# Patient Record
Sex: MALE | Race: WHITE | Employment: FULL TIME | URBAN - METROPOLITAN AREA
[De-identification: names, ages, dates, MRNs, and addresses within clinical notes are randomized per-mention and may not be internally consistent; named-entity substitution may affect disease eponyms.]

---

## 2017-03-15 RX ORDER — SIMVASTATIN 20 MG
TABLET ORAL
Qty: 90 TABLET | Refills: 0 | OUTPATIENT
Start: 2017-03-15

## 2017-03-22 ENCOUNTER — TELEPHONE (OUTPATIENT)
Dept: FAMILY MEDICINE CLINIC | Age: 57
End: 2017-03-22

## 2017-03-22 DIAGNOSIS — E78.00 HYPERCHOLESTEROLEMIA: ICD-10-CM

## 2017-03-22 DIAGNOSIS — Z12.5 SCREENING FOR PROSTATE CANCER: ICD-10-CM

## 2017-03-22 DIAGNOSIS — Z11.59 NEED FOR HEPATITIS C SCREENING TEST: ICD-10-CM

## 2017-03-22 DIAGNOSIS — Z00.00 ROUTINE GENERAL MEDICAL EXAMINATION AT A HEALTH CARE FACILITY: Primary | ICD-10-CM

## 2017-03-31 DIAGNOSIS — Z00.00 ROUTINE GENERAL MEDICAL EXAMINATION AT A HEALTH CARE FACILITY: ICD-10-CM

## 2017-03-31 DIAGNOSIS — Z11.59 NEED FOR HEPATITIS C SCREENING TEST: ICD-10-CM

## 2017-03-31 DIAGNOSIS — E78.00 HYPERCHOLESTEROLEMIA: ICD-10-CM

## 2017-03-31 DIAGNOSIS — Z12.5 SCREENING FOR PROSTATE CANCER: ICD-10-CM

## 2017-03-31 LAB
A/G RATIO: 1.7 (ref 1.1–2.2)
ALBUMIN SERPL-MCNC: 4.2 G/DL (ref 3.4–5)
ALP BLD-CCNC: 95 U/L (ref 40–129)
ALT SERPL-CCNC: 28 U/L (ref 10–40)
ANION GAP SERPL CALCULATED.3IONS-SCNC: 13 MMOL/L (ref 3–16)
AST SERPL-CCNC: 16 U/L (ref 15–37)
BILIRUB SERPL-MCNC: 0.4 MG/DL (ref 0–1)
BUN BLDV-MCNC: 15 MG/DL (ref 7–20)
CALCIUM SERPL-MCNC: 9.1 MG/DL (ref 8.3–10.6)
CHLORIDE BLD-SCNC: 106 MMOL/L (ref 99–110)
CHOLESTEROL, TOTAL: 177 MG/DL (ref 0–199)
CO2: 25 MMOL/L (ref 21–32)
CREAT SERPL-MCNC: 0.7 MG/DL (ref 0.9–1.3)
GFR AFRICAN AMERICAN: >60
GFR NON-AFRICAN AMERICAN: >60
GLOBULIN: 2.5 G/DL
GLUCOSE BLD-MCNC: 94 MG/DL (ref 70–99)
HDLC SERPL-MCNC: 42 MG/DL (ref 40–60)
HEPATITIS C ANTIBODY INTERPRETATION: NORMAL
LDL CHOLESTEROL CALCULATED: 119 MG/DL
POTASSIUM SERPL-SCNC: 4.8 MMOL/L (ref 3.5–5.1)
PROSTATE SPECIFIC ANTIGEN: 0.27 NG/ML (ref 0–4)
SODIUM BLD-SCNC: 144 MMOL/L (ref 136–145)
TOTAL PROTEIN: 6.7 G/DL (ref 6.4–8.2)
TRIGL SERPL-MCNC: 82 MG/DL (ref 0–150)
VLDLC SERPL CALC-MCNC: 16 MG/DL

## 2017-04-21 ENCOUNTER — OFFICE VISIT (OUTPATIENT)
Dept: FAMILY MEDICINE CLINIC | Age: 57
End: 2017-04-21

## 2017-04-21 VITALS
DIASTOLIC BLOOD PRESSURE: 80 MMHG | WEIGHT: 279 LBS | BODY MASS INDEX: 39.06 KG/M2 | HEIGHT: 71 IN | SYSTOLIC BLOOD PRESSURE: 110 MMHG | TEMPERATURE: 98.2 F

## 2017-04-21 DIAGNOSIS — Z12.5 SCREENING FOR PROSTATE CANCER: ICD-10-CM

## 2017-04-21 DIAGNOSIS — E78.00 HYPERCHOLESTEROLEMIA: ICD-10-CM

## 2017-04-21 DIAGNOSIS — I83.93 VARICOSE VEINS OF BOTH LOWER EXTREMITIES: ICD-10-CM

## 2017-04-21 DIAGNOSIS — Z00.00 PREVENTATIVE HEALTH CARE: Primary | ICD-10-CM

## 2017-04-21 DIAGNOSIS — E66.9 OBESITY (BMI 30-39.9): ICD-10-CM

## 2017-04-21 PROCEDURE — 99396 PREV VISIT EST AGE 40-64: CPT | Performed by: FAMILY MEDICINE

## 2017-04-21 RX ORDER — SIMVASTATIN 20 MG
TABLET ORAL
Qty: 90 TABLET | Refills: 3 | Status: SHIPPED | OUTPATIENT
Start: 2017-04-21 | End: 2018-05-29 | Stop reason: SDUPTHER

## 2017-04-21 ASSESSMENT — PATIENT HEALTH QUESTIONNAIRE - PHQ9
1. LITTLE INTEREST OR PLEASURE IN DOING THINGS: 0
2. FEELING DOWN, DEPRESSED OR HOPELESS: 0
SUM OF ALL RESPONSES TO PHQ QUESTIONS 1-9: 0
SUM OF ALL RESPONSES TO PHQ9 QUESTIONS 1 & 2: 0

## 2017-05-22 ENCOUNTER — OFFICE VISIT (OUTPATIENT)
Dept: SURGERY | Age: 57
End: 2017-05-22

## 2017-05-22 VITALS
HEIGHT: 70 IN | BODY MASS INDEX: 40.23 KG/M2 | WEIGHT: 281 LBS | SYSTOLIC BLOOD PRESSURE: 127 MMHG | HEART RATE: 96 BPM | DIASTOLIC BLOOD PRESSURE: 73 MMHG

## 2017-05-22 DIAGNOSIS — E66.9 OBESITY (BMI 30-39.9): ICD-10-CM

## 2017-05-22 DIAGNOSIS — I83.893 VARICOSE VEINS OF BOTH LOWER EXTREMITIES WITH COMPLICATIONS: Primary | ICD-10-CM

## 2017-05-22 DIAGNOSIS — E78.00 HYPERCHOLESTEROLEMIA: ICD-10-CM

## 2017-05-22 DIAGNOSIS — I87.2 CHRONIC VENOUS INSUFFICIENCY: ICD-10-CM

## 2017-05-22 PROCEDURE — 99243 OFF/OP CNSLTJ NEW/EST LOW 30: CPT | Performed by: SURGERY

## 2017-05-22 ASSESSMENT — ENCOUNTER SYMPTOMS
BLOOD IN STOOL: 0
ABDOMINAL PAIN: 0
ROS SKIN COMMENTS: NO SKIN WOUNDS.
SHORTNESS OF BREATH: 0

## 2017-06-02 ENCOUNTER — PROCEDURE VISIT (OUTPATIENT)
Dept: SURGERY | Age: 57
End: 2017-06-02

## 2017-06-02 ENCOUNTER — OFFICE VISIT (OUTPATIENT)
Dept: SURGERY | Age: 57
End: 2017-06-02

## 2017-06-02 VITALS
SYSTOLIC BLOOD PRESSURE: 123 MMHG | DIASTOLIC BLOOD PRESSURE: 84 MMHG | WEIGHT: 281.6 LBS | HEIGHT: 70 IN | BODY MASS INDEX: 40.31 KG/M2 | HEART RATE: 82 BPM

## 2017-06-02 DIAGNOSIS — I87.2 CHRONIC VENOUS INSUFFICIENCY: ICD-10-CM

## 2017-06-02 DIAGNOSIS — I83.893 VARICOSE VEINS OF BOTH LOWER EXTREMITIES WITH COMPLICATIONS: ICD-10-CM

## 2017-06-02 DIAGNOSIS — I87.2 CHRONIC VENOUS INSUFFICIENCY: Primary | ICD-10-CM

## 2017-06-02 DIAGNOSIS — E66.9 OBESITY (BMI 30-39.9): ICD-10-CM

## 2017-06-02 PROCEDURE — 93970 EXTREMITY STUDY: CPT | Performed by: SURGERY

## 2017-06-02 PROCEDURE — 99213 OFFICE O/P EST LOW 20 MIN: CPT | Performed by: SURGERY

## 2017-06-02 ASSESSMENT — ENCOUNTER SYMPTOMS: COLOR CHANGE: 1

## 2017-06-28 ENCOUNTER — OFFICE VISIT (OUTPATIENT)
Dept: FAMILY MEDICINE CLINIC | Age: 57
End: 2017-06-28

## 2017-06-28 ENCOUNTER — HOSPITAL ENCOUNTER (OUTPATIENT)
Dept: NON INVASIVE DIAGNOSTICS | Age: 57
Discharge: HOME | End: 2017-06-28
Attending: FAMILY MEDICINE | Admitting: FAMILY MEDICINE

## 2017-06-28 VITALS
HEIGHT: 70 IN | BODY MASS INDEX: 39.03 KG/M2 | TEMPERATURE: 97.9 F | SYSTOLIC BLOOD PRESSURE: 122 MMHG | DIASTOLIC BLOOD PRESSURE: 78 MMHG | WEIGHT: 272.6 LBS

## 2017-06-28 DIAGNOSIS — J40 BRONCHITIS: ICD-10-CM

## 2017-06-28 DIAGNOSIS — R04.2 HEMOPTYSIS: ICD-10-CM

## 2017-06-28 DIAGNOSIS — J40 BRONCHITIS: Primary | ICD-10-CM

## 2017-06-28 PROCEDURE — 99213 OFFICE O/P EST LOW 20 MIN: CPT | Performed by: FAMILY MEDICINE

## 2017-06-28 RX ORDER — AMOXICILLIN AND CLAVULANATE POTASSIUM 875; 125 MG/1; MG/1
1 TABLET, FILM COATED ORAL 2 TIMES DAILY
Qty: 20 TABLET | Refills: 0 | Status: SHIPPED | OUTPATIENT
Start: 2017-06-28 | End: 2017-06-28 | Stop reason: ALTCHOICE

## 2017-06-28 RX ORDER — LEVOFLOXACIN 500 MG/1
500 TABLET, FILM COATED ORAL DAILY
Qty: 10 TABLET | Refills: 0 | Status: SHIPPED | OUTPATIENT
Start: 2017-06-28 | End: 2017-07-08

## 2017-07-10 ENCOUNTER — OFFICE VISIT (OUTPATIENT)
Dept: FAMILY MEDICINE CLINIC | Age: 57
End: 2017-07-10

## 2017-07-10 VITALS
BODY MASS INDEX: 37.88 KG/M2 | DIASTOLIC BLOOD PRESSURE: 78 MMHG | SYSTOLIC BLOOD PRESSURE: 122 MMHG | TEMPERATURE: 98.1 F | WEIGHT: 264.6 LBS | HEIGHT: 70 IN

## 2017-07-10 DIAGNOSIS — J18.9 PNEUMONIA OF LEFT LOWER LOBE DUE TO INFECTIOUS ORGANISM: ICD-10-CM

## 2017-07-10 DIAGNOSIS — K62.5 RECTAL BLEEDING: Primary | ICD-10-CM

## 2017-07-10 DIAGNOSIS — R05.9 COUGH: ICD-10-CM

## 2017-07-10 PROCEDURE — 99213 OFFICE O/P EST LOW 20 MIN: CPT | Performed by: FAMILY MEDICINE

## 2017-07-10 RX ORDER — ALBUTEROL SULFATE 90 UG/1
2 AEROSOL, METERED RESPIRATORY (INHALATION) EVERY 6 HOURS PRN
Qty: 1 INHALER | Refills: 0 | Status: SHIPPED | OUTPATIENT
Start: 2017-07-10 | End: 2017-08-11

## 2017-07-23 ENCOUNTER — HOSPITAL ENCOUNTER (OUTPATIENT)
Dept: NON INVASIVE DIAGNOSTICS | Age: 57
Discharge: HOME | End: 2017-07-23
Attending: FAMILY MEDICINE | Admitting: FAMILY MEDICINE

## 2017-07-23 DIAGNOSIS — R05.9 COUGH: ICD-10-CM

## 2017-07-23 DIAGNOSIS — J18.9 PNEUMONIA OF LEFT LOWER LOBE DUE TO INFECTIOUS ORGANISM: ICD-10-CM

## 2017-08-11 ENCOUNTER — OFFICE VISIT (OUTPATIENT)
Dept: FAMILY MEDICINE CLINIC | Age: 57
End: 2017-08-11

## 2017-08-11 VITALS
SYSTOLIC BLOOD PRESSURE: 118 MMHG | BODY MASS INDEX: 37.51 KG/M2 | HEIGHT: 70 IN | DIASTOLIC BLOOD PRESSURE: 78 MMHG | WEIGHT: 262 LBS | HEART RATE: 71 BPM | TEMPERATURE: 98 F

## 2017-08-11 DIAGNOSIS — R10.9 LEFT SIDED ABDOMINAL PAIN: ICD-10-CM

## 2017-08-11 DIAGNOSIS — R07.9 LEFT SIDED CHEST PAIN: Primary | ICD-10-CM

## 2017-08-11 PROCEDURE — 99214 OFFICE O/P EST MOD 30 MIN: CPT | Performed by: FAMILY MEDICINE

## 2017-08-11 PROCEDURE — 93000 ELECTROCARDIOGRAM COMPLETE: CPT | Performed by: FAMILY MEDICINE

## 2017-08-11 RX ORDER — RANITIDINE 300 MG/1
300 TABLET ORAL NIGHTLY
Qty: 30 TABLET | Refills: 3 | Status: SHIPPED | OUTPATIENT
Start: 2017-08-11 | End: 2017-12-19 | Stop reason: SDUPTHER

## 2017-08-11 ASSESSMENT — ENCOUNTER SYMPTOMS
SHORTNESS OF BREATH: 0
ABDOMINAL PAIN: 1

## 2017-08-24 ENCOUNTER — HOSPITAL ENCOUNTER (OUTPATIENT)
Dept: NON INVASIVE DIAGNOSTICS | Age: 57
Discharge: HOME | End: 2017-08-24
Attending: FAMILY MEDICINE | Admitting: FAMILY MEDICINE

## 2017-08-24 DIAGNOSIS — R07.9 CHEST PAIN: ICD-10-CM

## 2017-09-01 ENCOUNTER — TELEPHONE (OUTPATIENT)
Dept: SURGERY | Age: 57
End: 2017-09-01

## 2017-09-29 ENCOUNTER — TELEPHONE (OUTPATIENT)
Dept: SURGERY | Age: 57
End: 2017-09-29

## 2017-09-29 NOTE — TELEPHONE ENCOUNTER
Patient states that he has received approval from his insurance company to proceed with surgery for varicose veins. He is going to fax copy of this to our office on Monday as we have not received this yet.

## 2017-09-29 NOTE — TELEPHONE ENCOUNTER
Patient was seen last June for veracious veins in both legs. He received the letter of approval about 3 weeks ago from his insurance company. He would like to have that procedure set up as soon as possible.

## 2017-10-09 ENCOUNTER — TELEPHONE (OUTPATIENT)
Dept: SURGERY | Age: 57
End: 2017-10-09

## 2017-10-10 ENCOUNTER — TELEPHONE (OUTPATIENT)
Dept: SURGERY | Age: 57
End: 2017-10-10

## 2017-10-12 ENCOUNTER — HOSPITAL ENCOUNTER (OUTPATIENT)
Dept: CARDIAC CATH/INVASIVE PROCEDURES | Age: 57
Discharge: HOME | End: 2017-10-12
Attending: SURGERY | Admitting: SURGERY

## 2017-10-12 VITALS — WEIGHT: 267 LBS | HEIGHT: 70 IN | BODY MASS INDEX: 38.22 KG/M2

## 2017-10-12 DIAGNOSIS — I83.893 VARICOSE VEINS OF BOTH LOWER EXTREMITIES WITH COMPLICATIONS: Primary | ICD-10-CM

## 2017-10-12 PROCEDURE — 36478 ENDOVENOUS LASER 1ST VEIN: CPT | Performed by: SURGERY

## 2017-10-12 RX ORDER — SODIUM CHLORIDE 0.9 % (FLUSH) 0.9 %
10 SYRINGE (ML) INJECTION EVERY 12 HOURS SCHEDULED
Status: DISCONTINUED | OUTPATIENT
Start: 2017-10-12 | End: 2017-10-12 | Stop reason: ALTCHOICE

## 2017-10-12 RX ORDER — SODIUM CHLORIDE 9 MG/ML
INJECTION, SOLUTION INTRAVENOUS CONTINUOUS
Status: DISCONTINUED | OUTPATIENT
Start: 2017-10-12 | End: 2017-10-12 | Stop reason: ALTCHOICE

## 2017-10-12 RX ORDER — ACETAMINOPHEN 325 MG/1
650 TABLET ORAL EVERY 4 HOURS PRN
Status: DISCONTINUED | OUTPATIENT
Start: 2017-10-12 | End: 2017-10-13 | Stop reason: HOSPADM

## 2017-10-12 RX ORDER — MORPHINE SULFATE 10 MG/ML
2 INJECTION, SOLUTION INTRAMUSCULAR; INTRAVENOUS
Status: ACTIVE | OUTPATIENT
Start: 2017-10-12 | End: 2017-10-12

## 2017-10-12 RX ORDER — SODIUM CHLORIDE 0.9 % (FLUSH) 0.9 %
10 SYRINGE (ML) INJECTION PRN
Status: DISCONTINUED | OUTPATIENT
Start: 2017-10-12 | End: 2017-10-12 | Stop reason: ALTCHOICE

## 2017-10-12 RX ORDER — SODIUM CHLORIDE 9 MG/ML
INJECTION, SOLUTION INTRAVENOUS CONTINUOUS
Status: DISCONTINUED | OUTPATIENT
Start: 2017-10-12 | End: 2017-10-13 | Stop reason: HOSPADM

## 2017-10-12 RX ORDER — NAPROXEN 500 MG/1
500 TABLET ORAL EVERY 12 HOURS PRN
Qty: 20 TABLET | Refills: 3 | Status: SHIPPED | OUTPATIENT
Start: 2017-10-12 | End: 2018-06-15

## 2017-10-12 RX ORDER — FENTANYL CITRATE 50 UG/ML
25 INJECTION, SOLUTION INTRAMUSCULAR; INTRAVENOUS
Status: ACTIVE | OUTPATIENT
Start: 2017-10-12 | End: 2017-10-12

## 2017-10-12 RX ORDER — ONDANSETRON 2 MG/ML
4 INJECTION INTRAMUSCULAR; INTRAVENOUS EVERY 6 HOURS PRN
Status: DISCONTINUED | OUTPATIENT
Start: 2017-10-12 | End: 2017-10-13 | Stop reason: HOSPADM

## 2017-10-12 RX ORDER — 0.9 % SODIUM CHLORIDE 0.9 %
500 INTRAVENOUS SOLUTION INTRAVENOUS PRN
Status: DISCONTINUED | OUTPATIENT
Start: 2017-10-12 | End: 2017-10-13 | Stop reason: HOSPADM

## 2017-10-12 NOTE — PRE SEDATION
Sedation Pre-Procedure Note    Patient Name: Deborah Neil   YOB: 1960  Room/Bed: Room/bed info not found  Medical Record Number: [id number]  Date: 10/12/2017   Time: 7:47 AM       Indication:  CVI    Consent: I have discussed with the patient and/or the patient representative the indication, alternatives, and the possible risks and/or complications of the planned procedure and the anesthesia methods. The patient and/or patient representative appear to understand and agree to proceed. Vital Signs: There were no vitals filed for this visit. Past Medical History:   has a past medical history of Hyperlipidemia. Past Surgical History:   has a past surgical history that includes Knee cartilage surgery; Foot surgery; and Colonoscopy (11/28/2012). Medications:   Scheduled Meds:    sodium chloride flush  10 mL Intravenous 2 times per day     Continuous Infusions:    sodium chloride       PRN Meds: sodium chloride flush  Home Meds:   Prior to Admission medications    Medication Sig Start Date End Date Taking? Authorizing Provider   ranitidine (ZANTAC) 300 MG tablet Take 1 tablet by mouth nightly 8/11/17  Yes Ana Maria Moise,    simvastatin (ZOCOR) 20 MG tablet TAKE ONE TABLET BY MOUTH EVERY EVENING 4/21/17  Yes Bob Moise, DO   aspirin 81 MG EC tablet Take 81 mg by mouth daily. Yes Historical Provider, MD          Pre-Sedation Documentation and Exam:   I have personally completed a history, physical exam & review of systems for this patient (see notes).     Mallampati Airway Assessment:  normal    Prior History of Anesthesia Complications:   none    ASA Classification:  Class 2 - A normal healthy patient with mild systemic disease    Sedation/ Anesthesia Plan:   intravenous sedation    Medications Planned:   midazolam (Versed) intravenously and fentanyl intravenously    Patient is an appropriate candidate for plan of sedation: yes    Electronically signed by Carlos Maradiaga MD

## 2017-10-12 NOTE — PROCEDURES
surrounding the left great saphenous vein. Laser  ablation of the left great saphenous vein was performed by treating 45  cm with 2025 joules of energy. A separate access was then gained to the right great saphenous vein in  the proximal medial right calf. Access was gained again with the  6-Australian sheath, through which a laser fiber was advanced. The tip of  the laser was positioned 3 cm distal to the right saphenous-femoral  junction. Tumescent anesthesia was infused into the subcutaneous  tissues surrounding the right great saphenous vein, and laser ablation  of the right great saphenous vein was performed, treating 48 cm with  2300 joules of energy. The bilateral lower extremities were wrapped with compression  bandages, and the patient tolerated the procedure well. Eddie Acuña MD    D: 10/12/2017 8:34:10       T: 10/12/2017 16:35:29     [id number]  Job#: [id number]     Doc#: [id number]    CC:  WAQAS Cohen MD

## 2017-10-20 ENCOUNTER — PROCEDURE VISIT (OUTPATIENT)
Dept: SURGERY | Age: 57
End: 2017-10-20

## 2017-10-20 DIAGNOSIS — I87.2 CHRONIC VENOUS INSUFFICIENCY: ICD-10-CM

## 2017-10-20 DIAGNOSIS — I83.893 VARICOSE VEINS OF BOTH LOWER EXTREMITIES WITH COMPLICATIONS: Primary | ICD-10-CM

## 2017-10-20 PROCEDURE — 93970 EXTREMITY STUDY: CPT | Performed by: SURGERY

## 2017-11-16 ENCOUNTER — TELEPHONE (OUTPATIENT)
Dept: SURGERY | Age: 57
End: 2017-11-16

## 2017-11-16 NOTE — TELEPHONE ENCOUNTER
Spoke with patients wife and advised her that he had the procedure over a month ago and last scan showed no DVT

## 2017-12-08 ENCOUNTER — OFFICE VISIT (OUTPATIENT)
Dept: SURGERY | Age: 57
End: 2017-12-08

## 2017-12-08 VITALS
HEART RATE: 79 BPM | SYSTOLIC BLOOD PRESSURE: 114 MMHG | HEIGHT: 70 IN | BODY MASS INDEX: 38.65 KG/M2 | WEIGHT: 270 LBS | DIASTOLIC BLOOD PRESSURE: 75 MMHG

## 2017-12-08 DIAGNOSIS — I83.893 VARICOSE VEINS OF BOTH LOWER EXTREMITIES WITH COMPLICATIONS: Primary | ICD-10-CM

## 2017-12-08 DIAGNOSIS — I87.2 CHRONIC VENOUS INSUFFICIENCY: ICD-10-CM

## 2017-12-08 PROCEDURE — 99213 OFFICE O/P EST LOW 20 MIN: CPT | Performed by: SURGERY

## 2017-12-08 ASSESSMENT — ENCOUNTER SYMPTOMS: COLOR CHANGE: 1

## 2017-12-08 NOTE — PROGRESS NOTES
ablation of incompetent bilateral great saphenous veins. He is improved but continues to be bothered by varicosity pain in the bilateral legs from remaining secondary varicosities. The pain is severe enough to keep him awake at night. Venous duplex imaging was performed 10/20/17 after the laser ablations and it shows successful closure of the bilateral great saphenous veins with no DVT. Plan:       Recommend chemical ablation of remaining secondary symptomatic varicose veins of the bilateral lower extremities. Patient to elevate leg(s) with the ankle at or above the level of the heart as needed to relieve leg pain and swelling. Patient to participate in exercise as tolerated with focus on the leg(s) including, daily walking, repetitive toe pointing, and calve muscle pumping/stretching as tolerated. Patient was instructed to continue wearing compression stockings (20-30 mmHg) on a daily basis, off every night. Pre-determination for the vein closure procedure will be submitted to the insurance company on your behalf. GIOVANNY Evans MA am scribing for and in the presence of Gillian Gonzalez MD on this date of 12/08/17 at 11:00 AM    I Gillian Gonzalez MD personally performed the services described in this documentation as scribed by the Certified Medical Assistant Devonte Evans in my presence and it is both accurate and complete.

## 2017-12-19 RX ORDER — RANITIDINE 300 MG/1
TABLET ORAL
Qty: 30 TABLET | Refills: 2 | Status: SHIPPED | OUTPATIENT
Start: 2017-12-19 | End: 2018-04-19 | Stop reason: SDUPTHER

## 2018-04-23 RX ORDER — RANITIDINE 300 MG/1
TABLET ORAL
Qty: 30 TABLET | Refills: 1 | Status: SHIPPED | OUTPATIENT
Start: 2018-04-23 | End: 2019-09-30

## 2018-05-31 ENCOUNTER — TELEPHONE (OUTPATIENT)
Dept: FAMILY MEDICINE CLINIC | Age: 58
End: 2018-05-31

## 2018-05-31 RX ORDER — SIMVASTATIN 20 MG
TABLET ORAL
Qty: 30 TABLET | Refills: 0 | Status: SHIPPED | OUTPATIENT
Start: 2018-05-31 | End: 2018-06-15 | Stop reason: SDUPTHER

## 2018-06-01 DIAGNOSIS — E78.00 HYPERCHOLESTEROLEMIA: ICD-10-CM

## 2018-06-01 DIAGNOSIS — Z11.4 SCREENING FOR HIV WITHOUT PRESENCE OF RISK FACTORS: ICD-10-CM

## 2018-06-01 DIAGNOSIS — Z12.5 SCREENING FOR PROSTATE CANCER: ICD-10-CM

## 2018-06-01 DIAGNOSIS — Z00.00 ROUTINE GENERAL MEDICAL EXAMINATION AT A HEALTH CARE FACILITY: Primary | ICD-10-CM

## 2018-06-08 DIAGNOSIS — Z00.00 ROUTINE GENERAL MEDICAL EXAMINATION AT A HEALTH CARE FACILITY: ICD-10-CM

## 2018-06-08 DIAGNOSIS — Z11.4 SCREENING FOR HIV WITHOUT PRESENCE OF RISK FACTORS: ICD-10-CM

## 2018-06-08 DIAGNOSIS — Z12.5 SCREENING FOR PROSTATE CANCER: ICD-10-CM

## 2018-06-08 DIAGNOSIS — E78.00 HYPERCHOLESTEROLEMIA: ICD-10-CM

## 2018-06-08 LAB
A/G RATIO: 1.9 (ref 1.1–2.2)
ALBUMIN SERPL-MCNC: 4.3 G/DL (ref 3.4–5)
ALP BLD-CCNC: 92 U/L (ref 40–129)
ALT SERPL-CCNC: 29 U/L (ref 10–40)
ANION GAP SERPL CALCULATED.3IONS-SCNC: 13 MMOL/L (ref 3–16)
AST SERPL-CCNC: 16 U/L (ref 15–37)
BILIRUB SERPL-MCNC: 0.3 MG/DL (ref 0–1)
BUN BLDV-MCNC: 12 MG/DL (ref 7–20)
CALCIUM SERPL-MCNC: 8.8 MG/DL (ref 8.3–10.6)
CHLORIDE BLD-SCNC: 105 MMOL/L (ref 99–110)
CHOLESTEROL, TOTAL: 139 MG/DL (ref 0–199)
CO2: 23 MMOL/L (ref 21–32)
CREAT SERPL-MCNC: 0.6 MG/DL (ref 0.9–1.3)
GFR AFRICAN AMERICAN: >60
GFR NON-AFRICAN AMERICAN: >60
GLOBULIN: 2.3 G/DL
GLUCOSE BLD-MCNC: 89 MG/DL (ref 70–99)
HDLC SERPL-MCNC: 41 MG/DL (ref 40–60)
LDL CHOLESTEROL CALCULATED: 87 MG/DL
POTASSIUM SERPL-SCNC: 4.5 MMOL/L (ref 3.5–5.1)
PROSTATE SPECIFIC ANTIGEN: 0.28 NG/ML (ref 0–4)
SODIUM BLD-SCNC: 141 MMOL/L (ref 136–145)
TOTAL PROTEIN: 6.6 G/DL (ref 6.4–8.2)
TRIGL SERPL-MCNC: 54 MG/DL (ref 0–150)
VLDLC SERPL CALC-MCNC: 11 MG/DL

## 2018-06-11 LAB
HIV AG/AB: NORMAL
HIV ANTIGEN: NORMAL
HIV-1 ANTIBODY: NORMAL
HIV-2 AB: NORMAL

## 2018-06-15 ENCOUNTER — OFFICE VISIT (OUTPATIENT)
Dept: FAMILY MEDICINE CLINIC | Age: 58
End: 2018-06-15

## 2018-06-15 ENCOUNTER — HOSPITAL ENCOUNTER (OUTPATIENT)
Dept: NON INVASIVE DIAGNOSTICS | Age: 58
Discharge: HOME | End: 2018-06-15
Attending: FAMILY MEDICINE | Admitting: FAMILY MEDICINE

## 2018-06-15 VITALS
OXYGEN SATURATION: 98 % | DIASTOLIC BLOOD PRESSURE: 84 MMHG | BODY MASS INDEX: 39.74 KG/M2 | HEIGHT: 70 IN | RESPIRATION RATE: 12 BRPM | WEIGHT: 277.6 LBS | TEMPERATURE: 98.5 F | HEART RATE: 78 BPM | SYSTOLIC BLOOD PRESSURE: 122 MMHG

## 2018-06-15 DIAGNOSIS — M54.5 CHRONIC BILATERAL LOW BACK PAIN, WITH SCIATICA PRESENCE UNSPECIFIED: ICD-10-CM

## 2018-06-15 DIAGNOSIS — G89.29 CHRONIC BILATERAL LOW BACK PAIN, WITH SCIATICA PRESENCE UNSPECIFIED: ICD-10-CM

## 2018-06-15 DIAGNOSIS — Z00.00 PREVENTATIVE HEALTH CARE: Primary | ICD-10-CM

## 2018-06-15 DIAGNOSIS — E78.00 HYPERCHOLESTEROLEMIA: ICD-10-CM

## 2018-06-15 DIAGNOSIS — Z23 NEED FOR TDAP VACCINATION: ICD-10-CM

## 2018-06-15 DIAGNOSIS — M79.604 BILATERAL LEG PAIN: ICD-10-CM

## 2018-06-15 DIAGNOSIS — M79.605 BILATERAL LEG PAIN: ICD-10-CM

## 2018-06-15 PROBLEM — M54.50 CHRONIC BILATERAL LOW BACK PAIN: Status: ACTIVE | Noted: 2018-06-15

## 2018-06-15 LAB — TOTAL CK: 60 U/L (ref 39–308)

## 2018-06-15 PROCEDURE — 90715 TDAP VACCINE 7 YRS/> IM: CPT | Performed by: FAMILY MEDICINE

## 2018-06-15 PROCEDURE — 90471 IMMUNIZATION ADMIN: CPT | Performed by: FAMILY MEDICINE

## 2018-06-15 PROCEDURE — 99396 PREV VISIT EST AGE 40-64: CPT | Performed by: FAMILY MEDICINE

## 2018-06-15 RX ORDER — SIMVASTATIN 20 MG
TABLET ORAL
Qty: 90 TABLET | Refills: 3 | Status: SHIPPED | OUTPATIENT
Start: 2018-06-15 | End: 2019-07-23 | Stop reason: SDUPTHER

## 2018-06-15 RX ORDER — PREDNISOLONE ACETATE 10 MG/ML
SUSPENSION/ DROPS OPHTHALMIC
COMMUNITY
End: 2018-06-15 | Stop reason: ALTCHOICE

## 2018-06-15 ASSESSMENT — PATIENT HEALTH QUESTIONNAIRE - PHQ9
SUM OF ALL RESPONSES TO PHQ9 QUESTIONS 1 & 2: 0
2. FEELING DOWN, DEPRESSED OR HOPELESS: 0
SUM OF ALL RESPONSES TO PHQ QUESTIONS 1-9: 0
1. LITTLE INTEREST OR PLEASURE IN DOING THINGS: 0

## 2018-07-02 ENCOUNTER — OFFICE VISIT (OUTPATIENT)
Dept: SURGERY | Age: 58
End: 2018-07-02

## 2018-07-02 VITALS
WEIGHT: 281 LBS | HEIGHT: 70 IN | BODY MASS INDEX: 40.23 KG/M2 | HEART RATE: 80 BPM | DIASTOLIC BLOOD PRESSURE: 71 MMHG | SYSTOLIC BLOOD PRESSURE: 116 MMHG

## 2018-07-02 DIAGNOSIS — I83.893 VARICOSE VEINS OF BOTH LOWER EXTREMITIES WITH COMPLICATIONS: ICD-10-CM

## 2018-07-02 DIAGNOSIS — I87.2 CHRONIC VENOUS INSUFFICIENCY: ICD-10-CM

## 2018-07-02 DIAGNOSIS — M25.561 ACUTE PAIN OF RIGHT KNEE: ICD-10-CM

## 2018-07-02 DIAGNOSIS — M54.31 SCIATICA OF RIGHT SIDE: Primary | ICD-10-CM

## 2018-07-02 PROCEDURE — 99243 OFF/OP CNSLTJ NEW/EST LOW 30: CPT | Performed by: SURGERY

## 2018-07-02 NOTE — PROGRESS NOTES
Subjective:      Patient ID: Isha Andres is a 63 y.o. male. Chief Complaint   Patient presents with    Leg Pain     Pt presents for Rt Lower Extremity pain. Previous seen by Dr. Mariana Gupta for Chronic Venous Insuffiency. Complians pain of the Rt Le > Lt LE mostly with driving from the calf region down to the ankle of the Rt LE. Does take Ibuprofen as needed. Does have surgical stockings have not applied in a while, previous Sx of the Varicose Veins last year. HPI  He has a 3 week history of right leg, deep pain that is worsened by sitting. Location is primarily in right posterior lateral thigh region. He did experience some back pain a couple of months ago and an xray was not too revealing. Did have a stumbling event after the back pain onset and this involved negotiating a set of steps when descending the steps  Did have venous intervention last November to do ablation on the superficial veins bilaterally. Has not worn stockings once the healing had taken place. He needs new stockings. Takes an occasional Ibuprofen  He is concerned about an upcoming driving trip for vacation to [address] for his wife who has stage 4 breast cancer. Past history of left knee meniscus repair many years ago      Review of Systems    Objective:   Physical Exam   Constitutional: He appears well-developed and well-nourished. Overweight and obese   HENT:   Head: Normocephalic. Neck: Neck supple. Cardiovascular: Normal rate, regular rhythm, S1 normal, normal heart sounds and intact distal pulses. Pulses:       Carotid pulses are 2+ on the right side, and 2+ on the left side. Radial pulses are 2+ on the right side, and 2+ on the left side. Femoral pulses are 2+ on the right side, and 2+ on the left side. Popliteal pulses are 2+ on the right side, and 2+ on the left side. Dorsalis pedis pulses are 2+ on the right side, and 2+ on the left side.         Posterior tibial pulses are 2+ on the right side, and 2+ on the left side. Widely distributed varicose veins in the skin and subcutaneous tissues with skin veins at risk for rupture particularly at medial thigh  Pigmentation changes to skin of both legs in gaiter area   Pulmonary/Chest: No respiratory distress. He has no wheezes. He has no rales. Abdominal: He exhibits no distension and no mass. There is no tenderness. There is no guarding. obesity   Musculoskeletal: He exhibits no edema. Right knee: Tenderness found. Lateral joint line tenderness noted. Lumbar back: He exhibits tenderness (right SI joint) and pain (straight leg raising increases pain in the back and right leg). Right upper leg: He exhibits deformity (tight hamstrings (frequently cramp by his report)). Right lower leg: He exhibits swelling. Left lower leg: He exhibits swelling ( more than righ). Neurological: He is alert. No cranial nerve deficit. Coordination normal.   Skin: Skin is warm. Venous stasis pigmentation bilaterally, right more intense than left. Vitals reviewed. Assessment:       Diagnosis Orders   1. Sciatica of right side     2. Acute pain of right knee     3. Chronic venous insufficiency     4. Varicose veins of both lower extremities with complications     5. Class 3 obesity due to excess calories with serious comorbidity and body mass index (BMI) of 40.0 to 44.9 in adult [facility])             Plan:      Stretch the hamstring muscles in the back of the thighs every day as we discussed in the office visit  Use Ibuprofen 800 mg 3 times daily on a full stomach to reduce the inflammation  Weight loss advised  Knee high 30-40 mm Hg stockings  (Rx written for stocking wear)  If the pain persists, you would be best served by an Orthopedic assessment.

## 2018-07-02 NOTE — PATIENT INSTRUCTIONS
Stretch the hamstring muscles in the back of the thighs every day as we discussed in the office visit  Use Ibuprofen 800 mg 3 times daily on a full stomach to reduce the inflammation  Knee high 30-40 mm Hg stockings  If the pain persists, you would be best served by an Orthopedic assessment.

## 2018-07-10 ASSESSMENT — ENCOUNTER SYMPTOMS
BLOOD IN STOOL: 0
SHORTNESS OF BREATH: 0
BACK PAIN: 1
ROS SKIN COMMENTS: NO SKIN WOUNDS.
ABDOMINAL PAIN: 0

## 2018-09-10 ENCOUNTER — OFFICE VISIT (OUTPATIENT)
Dept: FAMILY MEDICINE CLINIC | Age: 58
End: 2018-09-10

## 2018-09-10 VITALS
TEMPERATURE: 97.5 F | SYSTOLIC BLOOD PRESSURE: 120 MMHG | HEIGHT: 70 IN | WEIGHT: 283 LBS | DIASTOLIC BLOOD PRESSURE: 82 MMHG | BODY MASS INDEX: 40.52 KG/M2

## 2018-09-10 DIAGNOSIS — J06.9 UPPER RESPIRATORY TRACT INFECTION, UNSPECIFIED TYPE: Primary | ICD-10-CM

## 2018-09-10 PROCEDURE — 99213 OFFICE O/P EST LOW 20 MIN: CPT | Performed by: FAMILY MEDICINE

## 2018-09-10 RX ORDER — AZITHROMYCIN 250 MG/1
TABLET, FILM COATED ORAL
Qty: 1 PACKET | Refills: 0 | Status: SHIPPED | OUTPATIENT
Start: 2018-09-10 | End: 2018-09-20

## 2018-09-10 NOTE — PROGRESS NOTES
Subjective:      Patient ID: Ammon Hoover is a 63 y.o. male. Patient presents with:  Congestion: sore throat, sinus pressure, cough, wheezing x 1 week    Above sx. Does feel warm  Cough is productive no blood  No ear pain  No tobacco for > 20 years  Some pnd no sinus pain   His grandkid was ill     height is 5' 10\" (1.778 m) and weight is 283 lb (128.4 kg). His oral temperature is 97.5 °F (36.4 °C). His blood pressure is 120/82. Current Outpatient Prescriptions:     simvastatin (ZOCOR) 20 MG tablet, TAKE ONE TABLET BY MOUTH EVERY EVENING    aspirin 81 MG EC tablet, Take 81 mg by mouth daily.   ranitidine (ZANTAC) 300 MG tablet, TAKE ONE TABLET BY MOUTH EVERY NIGHT          Review of Systems    Objective:   Physical Exam   Constitutional: He appears well-developed and well-nourished. No distress. HENT:   Head: Normocephalic and atraumatic. Right Ear: Tympanic membrane and ear canal normal.   Left Ear: Tympanic membrane and ear canal normal.   Nose: Nose normal.   Mouth/Throat: Uvula is midline, oropharynx is clear and moist and mucous membranes are normal. No oral lesions. Neck: Neck supple. Pulmonary/Chest: Effort normal and breath sounds normal. No accessory muscle usage. No tachypnea. No respiratory distress. He has no decreased breath sounds. He has no wheezes. He has no rhonchi. He has no rales. Lymphadenopathy:        Head (right side): No submental and no submandibular adenopathy present. Head (left side): No submental and no submandibular adenopathy present. He has no cervical adenopathy. Right: No supraclavicular adenopathy present. Left: No supraclavicular adenopathy present. Neurological: He is alert. Skin: Skin is warm, dry and intact. He is not diaphoretic. No pallor. Assessment:        Diagnosis Orders   1.  Upper respiratory tract infection, unspecified type         Doing well  Exam was unremarkable      Plan:      Use fluids  Rest  Robitussin DM  Take the antibiotic  Call if not better        Orders Placed This Encounter   Medications    azithromycin (ZITHROMAX) 250 MG tablet     Sig: Take 2 tabs (500 mg) on Day 1, and take 1 tab (250 mg) on days 2 through 5.      Dispense:  1 packet     Refill:  0       Hanh Aldana MD

## 2019-07-23 DIAGNOSIS — E78.00 HYPERCHOLESTEROLEMIA: ICD-10-CM

## 2019-07-29 RX ORDER — SIMVASTATIN 20 MG
TABLET ORAL
Qty: 30 TABLET | Refills: 2 | Status: SHIPPED | OUTPATIENT
Start: 2019-07-29 | End: 2019-10-24 | Stop reason: SDUPTHER

## 2019-09-30 ENCOUNTER — TELEPHONE (OUTPATIENT)
Dept: FAMILY MEDICINE CLINIC | Age: 59
End: 2019-09-30

## 2019-09-30 ENCOUNTER — OFFICE VISIT (OUTPATIENT)
Dept: FAMILY MEDICINE CLINIC | Age: 59
End: 2019-09-30
Payer: COMMERCIAL

## 2019-09-30 VITALS
DIASTOLIC BLOOD PRESSURE: 74 MMHG | TEMPERATURE: 99.3 F | HEIGHT: 70 IN | SYSTOLIC BLOOD PRESSURE: 116 MMHG | BODY MASS INDEX: 37.34 KG/M2 | WEIGHT: 260.8 LBS

## 2019-09-30 DIAGNOSIS — Z12.5 SCREENING FOR PROSTATE CANCER: ICD-10-CM

## 2019-09-30 DIAGNOSIS — B96.89 ACUTE BACTERIAL SINUSITIS: Primary | ICD-10-CM

## 2019-09-30 DIAGNOSIS — J01.90 ACUTE BACTERIAL SINUSITIS: Primary | ICD-10-CM

## 2019-09-30 DIAGNOSIS — E78.00 HYPERCHOLESTEROLEMIA: Primary | ICD-10-CM

## 2019-09-30 DIAGNOSIS — Z00.00 ANNUAL PHYSICAL EXAM: ICD-10-CM

## 2019-09-30 PROCEDURE — 99213 OFFICE O/P EST LOW 20 MIN: CPT | Performed by: INTERNAL MEDICINE

## 2019-09-30 RX ORDER — CEFUROXIME AXETIL 250 MG/1
250 TABLET ORAL 2 TIMES DAILY
Qty: 20 TABLET | Refills: 0 | Status: SHIPPED | OUTPATIENT
Start: 2019-09-30 | End: 2019-10-10

## 2019-09-30 ASSESSMENT — ENCOUNTER SYMPTOMS
SINUS PRESSURE: 1
COUGH: 1
SINUS PAIN: 1
RHINORRHEA: 1
ABDOMINAL PAIN: 0

## 2019-09-30 ASSESSMENT — PATIENT HEALTH QUESTIONNAIRE - PHQ9
2. FEELING DOWN, DEPRESSED OR HOPELESS: 0
SUM OF ALL RESPONSES TO PHQ9 QUESTIONS 1 & 2: 0
SUM OF ALL RESPONSES TO PHQ QUESTIONS 1-9: 0
1. LITTLE INTEREST OR PLEASURE IN DOING THINGS: 0
SUM OF ALL RESPONSES TO PHQ QUESTIONS 1-9: 0

## 2019-09-30 NOTE — PROGRESS NOTES
Subjective:      Patient ID: Jimmy Borges is a 63 y.o. male. HPI   Chief Complaint   Patient presents with    URI     patient c/o productive cough, chest discomfort, head congestion, sinus drainage, sore throat x 2-3 days. patient denies fever     Jimmy Borges is a 63 y.o. male with the following history as [facility] in [facility]:  Patient Active Problem List    Diagnosis Date Noted    Chronic bilateral low back pain 06/15/2018    Chronic venous insufficiency 06/02/2017    Varicose veins of both lower extremities with complications 14/97/0162    Hypercholesterolemia 05/29/2015    Left knee DJD 09/04/2013    Bronchitis 04/08/2013    Cough 04/08/2013    Obesity (BMI 30-39.9) 10/25/2012    Asymptomatic varicose veins 10/22/2010    Abnormal weight gain 01/21/2010    Elevated blood pressure reading without diagnosis of hypertension 01/14/2010     Current Outpatient Medications   Medication Sig Dispense Refill    simvastatin (ZOCOR) 20 MG tablet TAKE ONE TABLET BY MOUTH EVERY EVENING 30 tablet 2    aspirin 81 MG EC tablet Take 81 mg by mouth daily. No current facility-administered medications for this visit.       Allergies: Sulfa antibiotics  Past Medical History:   Diagnosis Date    GERD (gastroesophageal reflux disease)     Hyperlipidemia      Past Surgical History:   Procedure Laterality Date    COLONOSCOPY  11/28/2012    normal dr Sam Perez recheck 10 years    COLONOSCOPY  08/25/2017    normal dr Hany Spaulding Bilateral 03/2018    relieve pressure in eyes    FOOT SURGERY      left    KNEE CARTILAGE SURGERY      left    VARICOSE VEIN SURGERY Bilateral 11/2017     Family History   Problem Relation Age of Onset    Cancer Father 79        prostate/throat/lung    High Cholesterol Mother     Heart Attack Mother         mild    COPD Mother     No Known Problems Sister     Diabetes Brother     No Known Problems Maternal Grandmother     Heart Disease Maternal Grandfather    

## 2019-10-17 DIAGNOSIS — E78.00 HYPERCHOLESTEROLEMIA: ICD-10-CM

## 2019-10-17 DIAGNOSIS — Z12.5 SCREENING FOR PROSTATE CANCER: ICD-10-CM

## 2019-10-17 DIAGNOSIS — Z00.00 ANNUAL PHYSICAL EXAM: ICD-10-CM

## 2019-10-17 LAB
A/G RATIO: 1.8 (ref 1.1–2.2)
ALBUMIN SERPL-MCNC: 4.6 G/DL (ref 3.4–5)
ALP BLD-CCNC: 84 U/L (ref 40–129)
ALT SERPL-CCNC: 38 U/L (ref 10–40)
ANION GAP SERPL CALCULATED.3IONS-SCNC: 17 MMOL/L (ref 3–16)
AST SERPL-CCNC: 22 U/L (ref 15–37)
BILIRUB SERPL-MCNC: 0.6 MG/DL (ref 0–1)
BUN BLDV-MCNC: 15 MG/DL (ref 7–20)
CALCIUM SERPL-MCNC: 9.3 MG/DL (ref 8.3–10.6)
CHLORIDE BLD-SCNC: 101 MMOL/L (ref 99–110)
CHOLESTEROL, FASTING: 151 MG/DL (ref 0–199)
CO2: 22 MMOL/L (ref 21–32)
CREAT SERPL-MCNC: 0.7 MG/DL (ref 0.9–1.3)
GFR AFRICAN AMERICAN: >60
GFR NON-AFRICAN AMERICAN: >60
GLOBULIN: 2.5 G/DL
GLUCOSE BLD-MCNC: 78 MG/DL (ref 70–99)
HDLC SERPL-MCNC: 43 MG/DL (ref 40–60)
LDL CHOLESTEROL CALCULATED: 98 MG/DL
POTASSIUM SERPL-SCNC: 4.3 MMOL/L (ref 3.5–5.1)
PROSTATE SPECIFIC ANTIGEN: 0.34 NG/ML (ref 0–4)
SODIUM BLD-SCNC: 140 MMOL/L (ref 136–145)
TOTAL PROTEIN: 7.1 G/DL (ref 6.4–8.2)
TRIGLYCERIDE, FASTING: 49 MG/DL (ref 0–150)
VLDLC SERPL CALC-MCNC: 10 MG/DL

## 2019-10-24 ENCOUNTER — OFFICE VISIT (OUTPATIENT)
Dept: FAMILY MEDICINE CLINIC | Age: 59
End: 2019-10-24
Payer: COMMERCIAL

## 2019-10-24 VITALS
BODY MASS INDEX: 36.28 KG/M2 | HEIGHT: 70 IN | TEMPERATURE: 98.2 F | SYSTOLIC BLOOD PRESSURE: 120 MMHG | DIASTOLIC BLOOD PRESSURE: 80 MMHG | WEIGHT: 253.4 LBS

## 2019-10-24 DIAGNOSIS — Z23 NEEDS FLU SHOT: ICD-10-CM

## 2019-10-24 DIAGNOSIS — Z00.00 PREVENTATIVE HEALTH CARE: Primary | ICD-10-CM

## 2019-10-24 DIAGNOSIS — E66.9 OBESITY (BMI 30-39.9): ICD-10-CM

## 2019-10-24 DIAGNOSIS — I83.893 VARICOSE VEINS OF BOTH LOWER EXTREMITIES WITH COMPLICATIONS: ICD-10-CM

## 2019-10-24 DIAGNOSIS — E78.00 HYPERCHOLESTEROLEMIA: ICD-10-CM

## 2019-10-24 PROCEDURE — 99396 PREV VISIT EST AGE 40-64: CPT | Performed by: FAMILY MEDICINE

## 2019-10-24 PROCEDURE — 90471 IMMUNIZATION ADMIN: CPT | Performed by: FAMILY MEDICINE

## 2019-10-24 PROCEDURE — 90686 IIV4 VACC NO PRSV 0.5 ML IM: CPT | Performed by: FAMILY MEDICINE

## 2019-10-24 RX ORDER — SIMVASTATIN 20 MG
TABLET ORAL
Qty: 90 TABLET | Refills: 3 | Status: SHIPPED | OUTPATIENT
Start: 2019-10-24 | End: 2021-11-11

## 2019-11-01 ASSESSMENT — ENCOUNTER SYMPTOMS
DIARRHEA: 0
TROUBLE SWALLOWING: 0
VOMITING: 0
CONSTIPATION: 0
NAUSEA: 0
WHEEZING: 0
BLOOD IN STOOL: 0
ABDOMINAL PAIN: 0
EYE PAIN: 0
SHORTNESS OF BREATH: 0

## 2020-11-13 ENCOUNTER — TELEPHONE (OUTPATIENT)
Dept: FAMILY MEDICINE CLINIC | Age: 60
End: 2020-11-13

## 2020-11-13 NOTE — TELEPHONE ENCOUNTER
Former [facility] patient that is follow up with you, he is up to date with all vaccines.     Please advise, [phone]

## 2020-11-16 NOTE — TELEPHONE ENCOUNTER
Patient states he already had the tdap in 2018 and is good for now. He will call back to scheduled appt with Dr Julián Kinney.

## 2021-11-11 ENCOUNTER — HOSPITAL ENCOUNTER (EMERGENCY)
Age: 61
Discharge: HOME | End: 2021-11-11
Attending: EMERGENCY MEDICINE
Payer: COMMERCIAL

## 2021-11-11 VITALS
WEIGHT: 280.87 LBS | BODY MASS INDEX: 41.6 KG/M2 | HEIGHT: 69 IN | TEMPERATURE: 97.8 F | HEART RATE: 72 BPM | SYSTOLIC BLOOD PRESSURE: 110 MMHG | OXYGEN SATURATION: 96 % | RESPIRATION RATE: 16 BRPM | DIASTOLIC BLOOD PRESSURE: 72 MMHG

## 2021-11-11 DIAGNOSIS — T78.40XA ALLERGIC REACTION, INITIAL ENCOUNTER: Primary | ICD-10-CM

## 2021-11-11 LAB — S PYO AG THROAT QL: NEGATIVE

## 2021-11-11 PROCEDURE — 99285 EMERGENCY DEPT VISIT HI MDM: CPT

## 2021-11-11 PROCEDURE — 96374 THER/PROPH/DIAG INJ IV PUSH: CPT

## 2021-11-11 PROCEDURE — 96375 TX/PRO/DX INJ NEW DRUG ADDON: CPT

## 2021-11-11 PROCEDURE — 2500000003 HC RX 250 WO HCPCS: Performed by: EMERGENCY MEDICINE

## 2021-11-11 PROCEDURE — 6360000002 HC RX W HCPCS: Performed by: EMERGENCY MEDICINE

## 2021-11-11 PROCEDURE — 87081 CULTURE SCREEN ONLY: CPT

## 2021-11-11 PROCEDURE — 87880 STREP A ASSAY W/OPTIC: CPT

## 2021-11-11 RX ORDER — DIPHENHYDRAMINE HYDROCHLORIDE 50 MG/ML
50 INJECTION INTRAMUSCULAR; INTRAVENOUS ONCE
Status: COMPLETED | OUTPATIENT
Start: 2021-11-11 | End: 2021-11-11

## 2021-11-11 RX ORDER — METHYLPREDNISOLONE SODIUM SUCCINATE 125 MG/2ML
125 INJECTION, POWDER, LYOPHILIZED, FOR SOLUTION INTRAMUSCULAR; INTRAVENOUS ONCE
Status: COMPLETED | OUTPATIENT
Start: 2021-11-11 | End: 2021-11-11

## 2021-11-11 RX ORDER — PREDNISONE 20 MG/1
40 TABLET ORAL DAILY
Qty: 10 TABLET | Refills: 0 | Status: SHIPPED | OUTPATIENT
Start: 2021-11-11 | End: 2021-11-16

## 2021-11-11 RX ADMIN — METHYLPREDNISOLONE SODIUM SUCCINATE 125 MG: 125 INJECTION, POWDER, FOR SOLUTION INTRAMUSCULAR; INTRAVENOUS at 01:05

## 2021-11-11 RX ADMIN — DIPHENHYDRAMINE HYDROCHLORIDE 50 MG: 50 INJECTION, SOLUTION INTRAMUSCULAR; INTRAVENOUS at 01:03

## 2021-11-11 RX ADMIN — FAMOTIDINE 40 MG: 10 INJECTION, SOLUTION INTRAVENOUS at 01:07

## 2021-11-11 NOTE — ED NOTES
Patient awakens to name, denies shortness or breath, respirations easy and regular. Patient denies further needs at this time. Patient's daughter given a pillow. Lights in room dimmed, call light near.       Ansley Arredondo RN  11/11/21 0911

## 2021-11-11 NOTE — ED PROVIDER NOTES
[facility]  eMERGENCY dEPARTMENT eNCOUnter      Pt Name: Moises Morrell  MRN: [id number]  [place] 1960  Date of evaluation: 11/11/2021  Provider: Ailyn Hernandez MD    [address] [place] [address]       Chief Complaint   Patient presents with    Pharyngitis     Patient states his uvula is swollen and woke him up at 2315. States it felt like he had something in his throat and he coughed and his uvula looked enlarged. HISTORY OF PRESENT ILLNESS  (Location/Symptom, Timing/Onset, Context/Setting, Quality, Duration, Modifying Factors, Severity.)   Moises Morrell is a 62 y.o. male who presents to the emergency department planing of some discomfort in his throat. He woke up around 1115 this evening. Felt like there was something in his throat. He coughed. He checked his throat and his uvula was enlarged. No difficulty swallowing or breathing. No recent illness. No congestion cough sore throat or earache. No itching or rash. No new exposures. Nursing Notes were reviewed and I agree. REVIEW OF SYSTEMS    (2-9 systems for level 4, 10 or more for level 5)     General: No fever or chills. Eyes: No redness or discharge. ENT: Throat discomfort, swelling of his uvula. Respiratory: No shortness of breath. GI: No vomiting or diarrhea. Skin: No rash. Except as noted above the remainder of the review of systems was reviewed and negative.        PAST MEDICAL HISTORY         Diagnosis Date    GERD (gastroesophageal reflux disease)     Hyperlipidemia        SURGICAL HISTORY           Procedure Laterality Date    COLONOSCOPY  11/28/2012    normal dr Claire Conroy recheck 10 years    COLONOSCOPY  08/25/2017    normal dr Joseph Zhou Bilateral 03/2018    relieve pressure in eyes    FOOT SURGERY      left    KNEE CARTILAGE SURGERY      left    VARICOSE VEIN SURGERY Bilateral 11/2017       CURRENT MEDICATIONS       Previous Medications    ASPIRIN 81 MG EC TABLET    Take 81 mg by mouth daily. ALLERGIES     Sulfa antibiotics    FAMILY HISTORY           Problem Relation Age of Onset    Cancer Father 79        prostate/throat/lung    High Cholesterol Mother     Heart Attack Mother         mild    COPD Mother     No Known Problems Sister     Diabetes Brother     No Known Problems Maternal Grandmother     Heart Disease Maternal Grandfather     Heart Attack Paternal Grandmother     No Known Problems Paternal Grandfather     No Known Problems Sister     No Known Problems Sister     No Known Problems Brother     No Known Problems Brother     No Known Problems Brother     No Known Problems Brother     No Known Problems Brother     No Known Problems Brother      Family Status   Relation Name Status    Father      Mother      Sister Daron Fallon Alive    Brother Don Alive    MGM      MGF      PGM      PGF      Sister Isha Alive    Sister Sydni Alive    Brother Wilder Alive    Brother [employer]    Brother  [facility] Alive    Brother Jonathan Alive    Brother Medardo Alive    Brother JOANN [employer], [place]        SOCIAL HISTORY      reports that he quit smoking about 29 years ago. His smoking use included cigarettes. He started smoking about 44 years ago. He has a 7.50 pack-year smoking history. He has never used smokeless tobacco. He reports current alcohol use. He reports that he does not use drugs. PHYSICAL EXAM    (up to 7 for level 4, 8 or more for level 5)     ED Triage Vitals [21 0042]   BP Temp Temp Source Pulse Resp SpO2 Height Weight   131/80 97.8 °F (36.6 °C) Tympanic 92 18 96 % 5' 9\" (1.753 m) 280 lb 13.9 oz (127.4 kg)       General: Alert moderately obese white male no acute distress. Head: Atraumatic and normocephalic. Eyes: No conjunctival injection. No discharge. Pupils equal round reactive. ENT: TMs are normal.  Nose clear. Oropharynx is moist.  The uvula is moderately enlarged. There is some erythema.   There is also some mild erythema and edema of the associated soft palate bilaterally and the tonsillar beds. Tonsils are 1+. There is mild posterior pharyngeal erythema. There is no exudate. Neck: Supple, nontender, no adenopathy. Heart: Regular rate and rhythm. No murmurs or gallops noted. Lungs: Breath sounds equal bilaterally and clear. Skin: No rash, good turgor. Neuro: Awake, alert, oriented. No focal motor deficits. Normal gait. DIAGNOSTIC RESULTS     RADIOLOGY:   Non-plain film images such as CT, Ultrasound and MRI are read by the radiologist. Plain radiographic images are visualized and preliminarily interpreted by Suzan Blackman MD with the below findings:      Interpretation per the Radiologist below, if available at the time of this note:    No orders to display       LABS:  Labs Reviewed   Jessica    Narrative:     Performed at:  [address]   Phone [phone]   CULTURE, BETA STREP CONFIRM PLATES       All other labs were within normal range or not returned as of this dictation. EMERGENCY DEPARTMENT COURSE and DIFFERENTIAL DIAGNOSIS/MDM:   Vitals:    Vitals:    11/11/21 0042 11/11/21 0110 11/11/21 0145   BP: 131/80  107/68   Pulse: 92 88 69   Resp: 18 18 18   Temp: 97.8 °F (36.6 °C)     TempSrc: Tympanic     SpO2: 96% 96% 94%   Weight: 280 lb 13.9 oz (127.4 kg)     Height: 5' 9\" (1.753 m)         This patient presents with swelling in his throat as above. No recent illness. No fever. No body aches. No congestion. His strep screen is negative. He was given Solu-Medrol, Benadryl, and Pepcid. He was reevaluated approximately an hour and a half later. It looks significantly better. This is likely allergic reaction. He was discharged on prednisone and Benadryl. He will return for any worsening of symptoms. He will follow up 2 to 3 days if not improving.   Diagnosis and treatment plan were discussed with the patient and his wife. They understand treatment plan follow-up as discussed. PROCEDURES:  None    FINAL IMPRESSION      1.  Allergic reaction, initial encounter          DISPOSITION/PLAN   DISPOSITION Decision To Discharge 11/11/2021 02:39:40 AM      PATIENT REFERRED TO:  Tu Holley MD  [address]  [phone]    In 2 days  If symptoms worsen      DISCHARGE MEDICATIONS:  New Prescriptions    PREDNISONE (DELTASONE) 20 MG TABLET    Take 2 tablets by mouth daily for 5 days       (Please note that portions of this note were completed with a voice recognition program.  Efforts were made to edit the dictations but occasionally words are mis-transcribed.)    Ailyn Hernandez MD  Attending Emergency Physician        Nimesh Oakley MD  11/11/21 [address], [place]  11/11/21 [address] [facility] Flynn Lang MD  11/11/21 9080

## 2021-11-11 NOTE — ED NOTES
Swelling and redness in patient's oropharynx and uvula resolved. Dr. Lavonne Mallory in room to reexamine patient and review lab results and discharge plan of care with patient.       Matt Smith RN  11/11/21 5523

## 2021-11-11 NOTE — ED TRIAGE NOTES
Patient ambulatory to Room 3 with c/o \"my uvula is red and swollen\". Patient states he awoke at [id number] 83 and felt like something was in his throat. States he coughed and noticed his uvula was swollen. Patient states his throat now feels dry but denies pain. Denies exposure to anything different. Denies shortness or breath. Respirations easy & regular, clear to auscultation, skin w/d, MMM & pink, cap refill brisk. Patients uvula and oropharynx is swollen and red at this time. Dr. Bruno Quispe in room to examine patient. Patient's daughter at bedside.

## 2021-11-12 ENCOUNTER — TELEPHONE (OUTPATIENT)
Dept: FAMILY MEDICINE CLINIC | Age: 61
End: 2021-11-12

## 2021-11-12 NOTE — TELEPHONE ENCOUNTER
----- Message from Sunil Pulido sent at 11/11/2021  5:18 PM EST -----  Subject: Message to Provider    QUESTIONS  Information for Provider? Pt was in ED at [facility] on 11/10 for swollen   uvula. They kept him for several hours for observation and advised they   thought it was an allergic reaction. Gave him a prescription steroid and   Benadryl and adv to f/u with PCP. Rapid strep test was neg, they will adv   in 24-48 hours on culture. Avail appts on 11/16, but pt requested later   time appt on 11/18. Screened green for Covid.   ---------------------------------------------------------------------------  --------------  CALL BACK INFO  What is the best way for the office to contact you? OK to leave message on   voicemail  Preferred Call Back Phone Number? [phone]  ---------------------------------------------------------------------------  --------------  SCRIPT ANSWERS  Relationship to Patient?  Self

## 2021-11-13 LAB — S PYO THROAT QL CULT: NORMAL

## 2021-11-18 ENCOUNTER — OFFICE VISIT (OUTPATIENT)
Dept: FAMILY MEDICINE CLINIC | Age: 61
End: 2021-11-18
Payer: COMMERCIAL

## 2021-11-18 VITALS
WEIGHT: 278 LBS | HEART RATE: 84 BPM | BODY MASS INDEX: 41.18 KG/M2 | HEIGHT: 69 IN | SYSTOLIC BLOOD PRESSURE: 118 MMHG | TEMPERATURE: 97.7 F | DIASTOLIC BLOOD PRESSURE: 80 MMHG

## 2021-11-18 DIAGNOSIS — Z12.5 PROSTATE CANCER SCREENING: ICD-10-CM

## 2021-11-18 DIAGNOSIS — E78.00 HYPERCHOLESTEROLEMIA: ICD-10-CM

## 2021-11-18 DIAGNOSIS — R22.1 SWOLLEN UVULA: ICD-10-CM

## 2021-11-18 DIAGNOSIS — Z09 HOSPITAL DISCHARGE FOLLOW-UP: Primary | ICD-10-CM

## 2021-11-18 DIAGNOSIS — Z23 NEEDS FLU SHOT: ICD-10-CM

## 2021-11-18 PROCEDURE — 90471 IMMUNIZATION ADMIN: CPT | Performed by: FAMILY MEDICINE

## 2021-11-18 PROCEDURE — 90674 CCIIV4 VAC NO PRSV 0.5 ML IM: CPT | Performed by: FAMILY MEDICINE

## 2021-11-18 PROCEDURE — 99214 OFFICE O/P EST MOD 30 MIN: CPT | Performed by: FAMILY MEDICINE

## 2021-11-18 SDOH — ECONOMIC STABILITY: FOOD INSECURITY: WITHIN THE PAST 12 MONTHS, YOU WORRIED THAT YOUR FOOD WOULD RUN OUT BEFORE YOU GOT MONEY TO BUY MORE.: NEVER TRUE

## 2021-11-18 SDOH — ECONOMIC STABILITY: FOOD INSECURITY: WITHIN THE PAST 12 MONTHS, THE FOOD YOU BOUGHT JUST DIDN'T LAST AND YOU DIDN'T HAVE MONEY TO GET MORE.: NEVER TRUE

## 2021-11-18 ASSESSMENT — PATIENT HEALTH QUESTIONNAIRE - PHQ9
1. LITTLE INTEREST OR PLEASURE IN DOING THINGS: 0
SUM OF ALL RESPONSES TO PHQ QUESTIONS 1-9: 0
SUM OF ALL RESPONSES TO PHQ9 QUESTIONS 1 & 2: 0
SUM OF ALL RESPONSES TO PHQ QUESTIONS 1-9: 0
SUM OF ALL RESPONSES TO PHQ QUESTIONS 1-9: 0
2. FEELING DOWN, DEPRESSED OR HOPELESS: 0

## 2021-11-18 ASSESSMENT — ENCOUNTER SYMPTOMS
BLOOD IN STOOL: 0
ABDOMINAL PAIN: 0
CONSTIPATION: 0
SHORTNESS OF BREATH: 0
DIARRHEA: 0

## 2021-11-18 ASSESSMENT — SOCIAL DETERMINANTS OF HEALTH (SDOH): HOW HARD IS IT FOR YOU TO PAY FOR THE VERY BASICS LIKE FOOD, HOUSING, MEDICAL CARE, AND HEATING?: NOT HARD AT ALL

## 2021-11-18 NOTE — PROGRESS NOTES
Subjective:      Patient ID: Rimma Alcantara is a 62 y.o. male. Chief Complaint   Patient presents with   Mackenzie Peck New Doctor    Follow-Up from [address] [place] [address] 11- \"allergic reaction\"        Patient presents with:  Samantha New Doctor  Follow-Up from Hospital: [facility] 11- \"allergic reaction\"    Here for the above  He had gone to ER with swelling  In throat no fever  Was told might be something took in or ate    Feeling well now  Was treated with steroid and antihistamine    YOB: 1960    Date of Visit:  11/18/2021     -- Sulfa Antibiotics -- Hives    Current Outpatient Medications:  aspirin 81 MG EC tablet, Take 81 mg by mouth daily. , Disp: , Rfl:     No current facility-administered medications for this visit.      ---------------------------               11/18/21                      1545         ---------------------------   BP:          118/80         Site:    Left Upper Arm     Position:     Sitting        Cuff Size:   Large Adult      Pulse:         84           Temp:   97.7 °F (36.5 °C)   TempSrc:    Temporal        Weight: 278 lb (126.1 kg)   Height:  5' 9\" (1.753 m)   ---------------------------  Body mass index is 41.05 kg/m². Wt Readings from Last 3 Encounters:  11/18/21 : 278 lb (126.1 kg)  11/11/21 : 280 lb 13.9 oz (127.4 kg)  10/24/19 : 253 lb 6.4 oz (114.9 kg)    BP Readings from Last 3 Encounters:  11/18/21 : 118/80  11/11/21 : 110/72  10/24/19 : 120/80        Review of Systems   Constitutional: Negative for appetite change, chills, fever and unexpected weight change. Respiratory: Negative for shortness of breath. Cardiovascular: Negative for chest pain. Gastrointestinal: Negative for abdominal pain, blood in stool, constipation and diarrhea.         For number of years will occ get hemorrhoid bleed with hard stool when he wipes not new    Genitourinary: Negative for difficulty urinating, dysuria and hematuria. Neurological: Negative for headaches. Occ ha not unusual        Objective:   Physical Exam  Constitutional:       General: He is not in acute distress. Appearance: Normal appearance. He is well-developed. He is not ill-appearing or diaphoretic. HENT:      Head: Normocephalic and atraumatic. Right Ear: Tympanic membrane and ear canal normal.      Left Ear: Tympanic membrane and ear canal normal.      Nose: Nose normal.      Mouth/Throat:      Lips: Pink. Mouth: Mucous membranes are moist. No oral lesions. Pharynx: Oropharynx is clear. Uvula midline. Eyes:      General: No scleral icterus. Pupils: Pupils are equal, round, and reactive to light. Neck:      Thyroid: No thyroid mass or thyromegaly. Cardiovascular:      Rate and Rhythm: Normal rate and regular rhythm. Heart sounds: Normal heart sounds. No murmur heard. No friction rub. No gallop. Comments:     Pulmonary:      Effort: Pulmonary effort is normal. No tachypnea, accessory muscle usage or respiratory distress. Breath sounds: Normal breath sounds. No decreased breath sounds, wheezing, rhonchi or rales. Chest:   Breasts:      Right: No supraclavicular adenopathy. Left: No supraclavicular adenopathy. Abdominal:      General: Bowel sounds are normal. There is no distension or abdominal bruit. Palpations: Abdomen is soft. There is no hepatomegaly, splenomegaly, mass or pulsatile mass. Tenderness: There is no abdominal tenderness. There is no guarding. Musculoskeletal:      Cervical back: Neck supple. Lymphadenopathy:      Head:      Right side of head: No submental, submandibular, preauricular or posterior auricular adenopathy. Left side of head: No submental, submandibular, preauricular or posterior auricular adenopathy. Cervical: No cervical adenopathy. Upper Body:      Right upper body: No supraclavicular adenopathy.       Left upper body: No supraclavicular adenopathy. Skin:     General: Skin is warm and dry. Coloration: Skin is not pale. Nails: There is no clubbing. Neurological:      General: No focal deficit present. Mental Status: He is alert. Assessment:        Diagnosis Orders   1. Hospital discharge follow-up     2. Swollen uvula     3. Hypercholesterolemia     4.  Prostate cancer screening  PSA screening       ER note reviewed from 11/11/21  Throat cx was fine on 11/11/21  [facility] at [facility] he had a nml glucose on 10/6/21  Lipid is actually not bad  and  on 10/6/21  This was off medicine as he stopped it   Vit D was low at 21    nml colon in 2017       Plan:      Bring me copy of the blood work that you get from the your work physical   Do take the vitamin D 1000 units a day  See back for a check up in about 5 months         Akash Hebert MD

## 2021-11-18 NOTE — PATIENT INSTRUCTIONS
Bring me copy of the blood work that you get from the your work physical   Do take the vitamin D 1000 units a day  See back for a check up in about 5 months

## 2021-12-08 ENCOUNTER — VIRTUAL VISIT (OUTPATIENT)
Dept: FAMILY MEDICINE CLINIC | Age: 61
End: 2021-12-08
Payer: COMMERCIAL

## 2021-12-08 DIAGNOSIS — B96.89 ACUTE BACTERIAL SINUSITIS: Primary | ICD-10-CM

## 2021-12-08 DIAGNOSIS — J01.90 ACUTE BACTERIAL SINUSITIS: Primary | ICD-10-CM

## 2021-12-08 PROCEDURE — 99213 OFFICE O/P EST LOW 20 MIN: CPT | Performed by: INTERNAL MEDICINE

## 2021-12-08 RX ORDER — CEFUROXIME AXETIL 250 MG/1
250 TABLET ORAL 2 TIMES DAILY
Qty: 20 TABLET | Refills: 0 | Status: SHIPPED | OUTPATIENT
Start: 2021-12-08 | End: 2021-12-18

## 2021-12-08 ASSESSMENT — ENCOUNTER SYMPTOMS
COUGH: 1
SINUS PRESSURE: 1
RHINORRHEA: 1
WHEEZING: 1
ABDOMINAL PAIN: 0

## 2021-12-08 NOTE — PROGRESS NOTES
12/8/2021    TELEHEALTH EVALUATION -- Audio/Visual (During RTUFK-17 public health emergency)    HPI:  Chief Complaint   Patient presents with    Sinusitis     ph. [phone]. patient c/o sinus congestion, wheezing since 12/5/2021; productive cough, sore throat from coughing. patient denies fever. patient has taken OTC Tylenol, Robitussin, Mucinex and NyQuil     Stephanie Alvarez is a 62 y.o. male with the following history as recorded in [facility]:  Patient Active Problem List    Diagnosis Date Noted    Chronic bilateral low back pain 06/15/2018    Chronic venous insufficiency 06/02/2017    Varicose veins of both lower extremities with complications 90/89/4673    Hypercholesterolemia 05/29/2015    Left knee DJD 09/04/2013    Bronchitis 04/08/2013    Cough 04/08/2013    Obesity (BMI 30-39.9) 10/25/2012    Asymptomatic varicose veins 10/22/2010    Abnormal weight gain 01/21/2010    Elevated blood pressure reading without diagnosis of hypertension 01/14/2010     No current outpatient medications on file. No current facility-administered medications for this visit.      Allergies: Sulfa antibiotics  Past Medical History:   Diagnosis Date    GERD (gastroesophageal reflux disease)     Hyperlipidemia      Past Surgical History:   Procedure Laterality Date    COLONOSCOPY  11/28/2012    normal dr Yassine Duncan recheck 10 years    COLONOSCOPY  08/25/2017    normal dr Tung Dennis Bilateral 03/2018    relieve pressure in eyes    FOOT SURGERY      left    KNEE CARTILAGE SURGERY      left    VARICOSE VEIN SURGERY Bilateral 11/2017     Family History   Problem Relation Age of Onset    Cancer Father 79        prostate/throat/lung    High Cholesterol Mother     Heart Attack Mother         mild    COPD Mother     No Known Problems Sister     Diabetes Brother     No Known Problems Maternal Grandmother     Heart Disease Maternal Grandfather     Heart Attack Paternal Grandmother     No Known Problems Paternal Grandfather     No Known Problems Sister     No Known Problems Sister     No Known Problems Brother     No Known Problems Brother     No Known Problems Brother     No Known Problems Brother     No Known Problems Brother     No Known Problems Brother      Social History     Tobacco Use    Smoking status: Former Smoker     Packs/day: 0.50     Years: 15.00     Pack years: 7.50     Types: Cigarettes     Start date: 7/10/1977     Quit date: 7/10/1992     Years since quittin.4    Smokeless tobacco: Never Used   Substance Use Topics    Alcohol use: Yes     Comment: [email or web address]       Stephanie Alvarez (:  1960) has requested an audio/video evaluation for the following concern(s):    Chief Complaint   Patient presents with    Sinusitis     ph. [phone]. patient c/o sinus congestion, wheezing since 2021; productive cough, sore throat from coughing. patient denies fever. patient has taken OTC Tylenol, Robitussin, Mucinex and NyQuil       Review of Systems   HENT: Positive for congestion, postnasal drip, rhinorrhea and sinus pressure. Respiratory: Positive for cough and wheezing. Cardiovascular: Negative for chest pain and palpitations. Gastrointestinal: Negative for abdominal pain. Genitourinary: Negative for dysuria and frequency.        Prior to Visit Medications    Not on File       Social History     Tobacco Use    Smoking status: Former Smoker     Packs/day: 0.50     Years: 15.00     Pack years: 7.50     Types: Cigarettes     Start date: 7/10/1977     Quit date: 7/10/1992     Years since quittin.4    Smokeless tobacco: Never Used   Vaping Use    Vaping Use: Never used   Substance Use Topics    Alcohol use: Yes     Comment: occasional    Drug use: No     Comment: quit 20 yrs ago            PHYSICAL EXAMINATION:  [ INSTRUCTIONS:  \"[x]\" Indicates a positive item  \"[]\" Indicates a negative item  -- DELETE ALL ITEMS NOT EXAMINED]  Vital Signs: (As obtained by patient/caregiver or practitioner observation)    Blood pressure-  Heart rate-    Respiratory rate-    Temperature-  Pulse oximetry-     Constitutional: [x] Appears well-developed and well-nourished [x] No apparent distress      [] Abnormal-   Mental status  [x] Alert and awake  [x] Oriented to person/place/time []Able to follow commands      Eyes:  EOM    [x]  Normal  [] Abnormal-  Sclera  [x]  Normal  [] Abnormal -         Discharge [x]  None visible  [] Abnormal -    HENT:   [x] Normocephalic, atraumatic. [] Abnormal   [] Mouth/Throat: Mucous membranes are moist.     External Ears [x] Normal  [] Abnormal-     Neck: [x] No visualized mass     Pulmonary/Chest: [x] Respiratory effort normal.  [] No visualized signs of difficulty breathing or respiratory distress        [] Abnormal-      Musculoskeletal:   [] Normal gait with no signs of ataxia         [] Normal range of motion of neck        [] Abnormal-       Neurological:        [] No Facial Asymmetry (Cranial nerve 7 motor function) (limited exam to video visit)          [] No gaze palsy        [] Abnormal-         Skin:        [x] No significant exanthematous lesions or discoloration noted on facial skin         [] Abnormal-            Psychiatric:       [x] Normal Affect [] No Hallucinations        [] Abnormal-     Other pertinent observable physical exam findings-     ASSESSMENT/PLAN:   Diagnosis Orders   1. Acute bacterial sinusitis     I instructed patient to have a covid test and to call back if positive . Outpatient Encounter Medications as of 12/8/2021   Medication Sig Dispense Refill    cefUROXime (CEFTIN) 250 MG tablet Take 1 tablet by mouth 2 times daily for 10 days 20 tablet 0    [DISCONTINUED] aspirin 81 MG EC tablet Take 81 mg by mouth daily. No facility-administered encounter medications on file as of 12/8/2021. No orders of the defined types were placed in this encounter.     Nav Lott, was evaluated through a synchronous (real-time) audio-video encounter. The patient (or guardian if applicable) is aware that this is a billable service. Verbal consent to proceed has been obtained within the past 12 months. The visit was conducted pursuant to the emergency declaration under the [facility], [address] [place] [address] authority and the [employer] Act. Patient identification was verified, and a caregiver was present when appropriate. The patient was located in a state where the provider was credentialed to provide care. Total time spent on this encounter: Not billed by time    --Pavel Dai DO on 12/8/2021 at 3:12 PM    An electronic signature was used to authenticate this note.

## 2022-01-04 ENCOUNTER — TELEPHONE (OUTPATIENT)
Dept: FAMILY MEDICINE CLINIC | Age: 62
End: 2022-01-04

## 2022-01-04 NOTE — TELEPHONE ENCOUNTER
----- Message from Yani Gamez sent at 1/4/2022  1:04 PM EST -----  Subject: Message to Provider    QUESTIONS  Information for Provider? patient tested positive for covid, with   symptoms, and patient would like to know what/if there is anything that he   should be doing to get well.  ---------------------------------------------------------------------------  --------------  CALL BACK INFO  What is the best way for the office to contact you? OK to leave message on   voicemail  Preferred Call Back Phone Number? [phone]  ---------------------------------------------------------------------------  --------------  SCRIPT ANSWERS  Relationship to Patient? Guardian  Representative Name? Nicole Ott  Is the Representative on the appropriate HIPAA document in Epic?  Yes

## 2022-01-04 NOTE — TELEPHONE ENCOUNTER
[phone] Nanci Ralph advised and verbalized understanding. He started with headache, cough and sinus problems 3 days ago. He states he was tested at [facility] (he will send results via [employer]).   He wants to be referred to the infusion center.  (will fax referral once test results are received.)

## 2022-01-05 NOTE — TELEPHONE ENCOUNTER
Jillian Shows advised that we have not received his test results yet. He will have his daughter fax the results.

## 2022-01-06 NOTE — TELEPHONE ENCOUNTER
Evan Bishop advised that we never received his test results. He states he is feeling better and will return call if he gets worse.

## 2022-04-21 ENCOUNTER — OFFICE VISIT (OUTPATIENT)
Dept: FAMILY MEDICINE CLINIC | Age: 62
End: 2022-04-21
Payer: COMMERCIAL

## 2022-04-21 VITALS
TEMPERATURE: 97.4 F | SYSTOLIC BLOOD PRESSURE: 128 MMHG | HEIGHT: 69 IN | HEART RATE: 84 BPM | DIASTOLIC BLOOD PRESSURE: 80 MMHG | BODY MASS INDEX: 41.77 KG/M2 | WEIGHT: 282 LBS

## 2022-04-21 DIAGNOSIS — Z12.5 PROSTATE CANCER SCREENING: ICD-10-CM

## 2022-04-21 DIAGNOSIS — Z00.00 WELL ADULT EXAM: Primary | ICD-10-CM

## 2022-04-21 DIAGNOSIS — R53.83 FATIGUE, UNSPECIFIED TYPE: ICD-10-CM

## 2022-04-21 DIAGNOSIS — R06.83 SNORES: ICD-10-CM

## 2022-04-21 LAB
BILIRUBIN, POC: NEGATIVE
BLOOD URINE, POC: NEGATIVE
CLARITY, POC: CLEAR
COLOR, POC: YELLOW
GLUCOSE URINE, POC: NEGATIVE
KETONES, POC: NEGATIVE
LEUKOCYTE EST, POC: NEGATIVE
NITRITE, POC: NEGATIVE
PH, POC: 5.5
PROTEIN, POC: NEGATIVE
SPECIFIC GRAVITY, POC: 1.03
UROBILINOGEN, POC: 0.2

## 2022-04-21 PROCEDURE — 99396 PREV VISIT EST AGE 40-64: CPT | Performed by: FAMILY MEDICINE

## 2022-04-21 PROCEDURE — 81002 URINALYSIS NONAUTO W/O SCOPE: CPT | Performed by: FAMILY MEDICINE

## 2022-04-21 ASSESSMENT — ENCOUNTER SYMPTOMS
NAUSEA: 0
COUGH: 0
SORE THROAT: 0
DIARRHEA: 0
VOICE CHANGE: 0
SHORTNESS OF BREATH: 0
BLOOD IN STOOL: 0
VOMITING: 0
CONSTIPATION: 0
CHEST TIGHTNESS: 0
ABDOMINAL DISTENTION: 0
TROUBLE SWALLOWING: 0
ABDOMINAL PAIN: 0

## 2022-04-21 ASSESSMENT — PATIENT HEALTH QUESTIONNAIRE - PHQ9
SUM OF ALL RESPONSES TO PHQ9 QUESTIONS 1 & 2: 0
1. LITTLE INTEREST OR PLEASURE IN DOING THINGS: 0
SUM OF ALL RESPONSES TO PHQ QUESTIONS 1-9: 0
2. FEELING DOWN, DEPRESSED OR HOPELESS: 0
SUM OF ALL RESPONSES TO PHQ QUESTIONS 1-9: 0

## 2022-04-21 NOTE — PROGRESS NOTES
Subjective:      Patient ID: Anil West is a 65 y.o. male. Chief Complaint   Patient presents with    Annual Exam        Patient presents with: Annual Exam    He notes snoring dry mouth in am and fatigue when wakes up  Wife passed about 2 years ago     YOB: 1960    Date of Visit:  4/21/2022     -- Sulfa Antibiotics -- Hives    No current outpatient medications on file. No current facility-administered medications for this visit.      ---------------------------               04/21/22                      1602         ---------------------------   BP:          128/80         Site:    Left Upper Arm     Position:     Sitting        Cuff Size:   Large Adult      Pulse:         84           Temp:   97.4 °F (36.3 °C)   TempSrc:    Temporal        Weight: 282 lb (127.9 kg)   Height:  5' 9\" (1.753 m)   ---------------------------  Body mass index is 41.64 kg/m². Wt Readings from Last 3 Encounters:  04/21/22 : 282 lb (127.9 kg)  11/18/21 : 278 lb (126.1 kg)  11/11/21 : 280 lb 13.9 oz (127.4 kg)    BP Readings from Last 3 Encounters:  04/21/22 : 128/80  11/18/21 : 118/80  11/11/21 : 110/72        Review of Systems   Constitutional: Negative for appetite change, chills, fever and unexpected weight change. HENT: Negative for sore throat, trouble swallowing and voice change. Respiratory: Negative for cough, chest tightness and shortness of breath. Cardiovascular: Negative for chest pain, palpitations and leg swelling. Gastrointestinal: Negative for abdominal distention, abdominal pain, blood in stool, constipation, diarrhea, nausea and vomiting. No dysphagia gerd if he eats late    Genitourinary: Negative for difficulty urinating, dysuria and hematuria. Skin: Negative for rash. Neurological: Negative for syncope and headaches. Hematological: Negative for adenopathy. Does not bruise/bleed easily.        Objective:   Physical Exam  Constitutional:       General: He is not in acute distress. Appearance: Normal appearance. He is well-developed. He is not ill-appearing or diaphoretic. Comments: obese   HENT:      Head: Normocephalic and atraumatic. Right Ear: Tympanic membrane and ear canal normal.      Left Ear: Tympanic membrane and ear canal normal.      Nose: Nose normal.      Mouth/Throat:      Lips: Pink. Mouth: Mucous membranes are moist. No oral lesions. Pharynx: Oropharynx is clear. Uvula midline. Eyes:      General: No scleral icterus. Neck:      Thyroid: No thyroid mass or thyromegaly. Cardiovascular:      Rate and Rhythm: Normal rate and regular rhythm. Heart sounds: Normal heart sounds. No murmur heard. No friction rub. No gallop. Comments:     Pulmonary:      Effort: Pulmonary effort is normal. No tachypnea, accessory muscle usage or respiratory distress. Breath sounds: Normal breath sounds. No decreased breath sounds, wheezing, rhonchi or rales. Chest:   Breasts:      Right: No supraclavicular adenopathy. Left: No supraclavicular adenopathy. Abdominal:      General: Bowel sounds are normal. There is no distension or abdominal bruit. Palpations: Abdomen is soft. There is no hepatomegaly, splenomegaly, mass or pulsatile mass. Tenderness: There is no abdominal tenderness. There is no guarding. Musculoskeletal:      Cervical back: Neck supple. Lymphadenopathy:      Head:      Right side of head: No submental, submandibular, preauricular or posterior auricular adenopathy. Left side of head: No submental, submandibular, preauricular or posterior auricular adenopathy. Cervical: No cervical adenopathy. Upper Body:      Right upper body: No supraclavicular adenopathy. Left upper body: No supraclavicular adenopathy. Skin:     General: Skin is warm and dry. Coloration: Skin is not pale. Nails: There is no clubbing.    Neurological: General: No focal deficit present. Mental Status: He is alert. Assessment:       Diagnosis Orders   1. Well adult exam  Comprehensive Metabolic Panel    TSH    CBC with Auto Differential    Lipid Panel    POCT Urinalysis no Micro   2. Snores  TSH    Nicolasa Thomas MD, Sleep Medicine, [facility]   3. Fatigue, unspecified type  Comprehensive Metabolic Panel    TSH    CBC with Auto Differential    Triny Dacosta MD, Sleep Medicine, [facility]   4.  Prostate cancer screening  PSA Screening     Evaluate for possible sleep apnea       Plan:      Do the fasting blood  See the sleep doctor         Manny Montoya MD

## 2022-04-27 ENCOUNTER — OFFICE VISIT (OUTPATIENT)
Dept: VASCULAR SURGERY | Age: 62
End: 2022-04-27
Payer: COMMERCIAL

## 2022-04-27 VITALS
SYSTOLIC BLOOD PRESSURE: 118 MMHG | BODY MASS INDEX: 41.77 KG/M2 | HEIGHT: 69 IN | DIASTOLIC BLOOD PRESSURE: 80 MMHG | WEIGHT: 282 LBS

## 2022-04-27 DIAGNOSIS — I83.893 VARICOSE VEINS OF BOTH LOWER EXTREMITIES WITH COMPLICATIONS: Primary | ICD-10-CM

## 2022-04-27 DIAGNOSIS — I87.2 STASIS DERMATITIS OF BOTH LEGS: ICD-10-CM

## 2022-04-27 PROCEDURE — 99204 OFFICE O/P NEW MOD 45 MIN: CPT | Performed by: SURGERY

## 2022-04-27 NOTE — PROGRESS NOTES
Subjective:      Patient ID: Rachael Sylvester is a 65 y.o. male. HPI Referral from Luis Angel Storey MD for evaluation of right ankle area skin discoloration and inflammation. S/P B GSV laser ablation 2017 by ISAURO Varela for the same issue. Has noted enlargement of previously present varicosities over the years as the problems with his legs have persisted or worsened. No history of SVT, audie ulceration but has had some swelling and notices occasional bleeding from his right leg. Right leg symptoms are much worse than the left including heaviness and discomfort. Has not worn prescription stockings in the recent past.    Past Medical History:   Diagnosis Date    GERD (gastroesophageal reflux disease)     Hyperlipidemia      Past Surgical History:   Procedure Laterality Date    COLONOSCOPY  2012    normal dr Denis murphy 10 years    COLONOSCOPY  2017    normal dr Azam Rocha Bilateral 2018    relieve pressure in eyes    FOOT SURGERY      left    KNEE CARTILAGE SURGERY      left    VARICOSE VEIN SURGERY Bilateral 2017     Allergies   Allergen Reactions    Sulfa Antibiotics Hives     No current outpatient medications on file. No current facility-administered medications for this visit.      Social History     Socioeconomic History    Marital status:      Spouse name: Not on file    Number of children: Not on file    Years of education: Not on file    Highest education level: Not on file   Occupational History    Not on file   Tobacco Use    Smoking status: Former Smoker     Packs/day: 0.50     Years: 15.00     Pack years: 7.50     Types: Cigarettes     Start date: 7/10/1977     Quit date: 7/10/1992     Years since quittin.8    Smokeless tobacco: Never Used   Vaping Use    Vaping Use: Never used   Substance and Sexual Activity    Alcohol use: Yes     Comment: occasional    Drug use: No     Comment: quit 20 yrs ago    Sexual activity: Yes     Partners: Female Other Topics Concern    Not on file   Social History Narrative    Not on file     Social Determinants of Health     Financial Resource Strain: Low Risk     Difficulty of Paying Living Expenses: Not hard at all   Food Insecurity: No Food Insecurity    Worried About Running Out of Food in the Last Year: Never true    [address] in the Last Year: Never true   Transportation Needs:     Lack of Transportation (Medical): Not on file    Lack of Transportation (Non-Medical):  Not on file   Physical Activity:     Days of Exercise per Week: Not on file    Minutes of Exercise per Session: Not on file   Stress:     Feeling of Stress : Not on file   Social Connections:     Frequency of Communication with Friends and Family: Not on file    Frequency of Social Gatherings with Friends and Family: Not on file    Attends Denominational Services: Not on file    Active Member of Clubs or Organizations: Not on file    Attends Club or Organization Meetings: Not on file    Marital Status: Not on file   Intimate Partner Violence:     Fear of Current or Ex-Partner: Not on file    Emotionally Abused: Not on file    Physically Abused: Not on file    Sexually Abused: Not on file   Housing Stability:     Unable to Pay for Housing in the Last Year: Not on file    Number of Places Lived in the Last Year: Not on file    Unstable Housing in the Last Year: Not on file     Family History   Problem Relation Age of Onset    Cancer Father 79        prostate/throat/lung    High Cholesterol Mother     Heart Attack Mother         mild    COPD Mother     No Known Problems Sister     Diabetes Brother     No Known Problems Maternal Grandmother     Heart Disease Maternal Grandfather     Heart Attack Paternal Grandmother     No Known Problems Paternal Grandfather     No Known Problems Sister     No Known Problems Sister     No Known Problems Brother     No Known Problems Brother     No Known Problems Brother     No Known Problems Brother     No Known Problems Brother     No Known Problems Brother          Review of Systems   All other systems reviewed and are negative. 15 pt ROS confirmed personally by this MD    Objective:   Physical Exam  Vitals and nursing note reviewed. Constitutional:       Appearance: He is obese. HENT:      Head: Normocephalic and atraumatic. Right Ear: External ear normal.      Left Ear: External ear normal.      Nose: Nose normal.      Mouth/Throat:      Mouth: Mucous membranes are moist.      Pharynx: Oropharynx is clear. Eyes:      Extraocular Movements: Extraocular movements intact. Conjunctiva/sclera: Conjunctivae normal.   Cardiovascular:      Rate and Rhythm: Normal rate and regular rhythm. Pulses: Normal pulses. Heart sounds: Normal heart sounds. Pulmonary:      Effort: Pulmonary effort is normal.      Breath sounds: Normal breath sounds. Abdominal:      General: Bowel sounds are normal.      Palpations: Abdomen is soft. There is no mass. Musculoskeletal:      Cervical back: Normal range of motion. Right lower leg: Edema (1+) present. Left lower leg: Edema (1+) present. Skin:     General: Skin is warm and dry. Capillary Refill: Capillary refill takes less than 2 seconds. Findings: Lesion present. Comments: B hemosiderin discoloration (R>>L) with mild erythema R leg - no breakdown   Neurological:      General: No focal deficit present. Mental Status: He is alert and oriented to person, place, and time. Cranial Nerves: No cranial nerve deficit. Sensory: No sensory deficit. Motor: No weakness. Coordination: Coordination normal.      Gait: Gait normal.   Psychiatric:         Mood and Affect: Mood normal.         Behavior: Behavior normal.         Thought Content:  Thought content normal.         Judgment: Judgment normal.       R size  L size   ++  Spider  telangiectasias ++      Reticular veins     Thigh/calf 5-7 mm Varicose   veins Thigh/calf 5-7 mm       Assessment:      Symptomatic recurrent varicose veins B legs with stasis dermatitis       Plan:      Convert to prescription grade 20/30 mmHg KH compression stockings daily. Prescription to [facility] provided. Apply OTC hydrocortisone cream B gaitor areas at HS. Elevate feet as possible. F/U at [facility] after B venous reflux scan to discuss results and treatment options. Explained in detail to pt and he understands. All questions answered.

## 2022-05-10 DIAGNOSIS — I83.893 VARICOSE VEINS OF BOTH LOWER EXTREMITIES WITH COMPLICATIONS: Primary | ICD-10-CM

## 2022-05-11 ENCOUNTER — PROCEDURE VISIT (OUTPATIENT)
Dept: SURGERY | Age: 62
End: 2022-05-11
Payer: COMMERCIAL

## 2022-05-11 DIAGNOSIS — I83.893 VARICOSE VEINS OF BOTH LOWER EXTREMITIES WITH COMPLICATIONS: ICD-10-CM

## 2022-05-11 PROCEDURE — 93970 EXTREMITY STUDY: CPT | Performed by: SURGERY

## 2022-05-26 ENCOUNTER — OFFICE VISIT (OUTPATIENT)
Dept: VASCULAR SURGERY | Age: 62
End: 2022-05-26
Payer: COMMERCIAL

## 2022-05-26 VITALS — WEIGHT: 282 LBS | BODY MASS INDEX: 41.77 KG/M2 | HEIGHT: 69 IN

## 2022-05-26 DIAGNOSIS — I83.893 VARICOSE VEINS OF BOTH LOWER EXTREMITIES WITH COMPLICATIONS: Primary | ICD-10-CM

## 2022-05-26 DIAGNOSIS — I87.2 STASIS DERMATITIS OF BOTH LEGS: ICD-10-CM

## 2022-05-26 PROCEDURE — 99213 OFFICE O/P EST LOW 20 MIN: CPT | Performed by: SURGERY

## 2022-05-26 NOTE — Clinical Note
Maylene Dakin was seen in follow-up today in our office after undergoing a venous reflux study. The study is unimpressive with no deep reflux and minimal superficial reflux as he has had previous ablation. He has not been reliable in the use of his compression stockings and he also has ongoing orthopedic issues with his knees that are being treated. Because of the less than certain outcome with treatment I have suggested that he religiously wear his stockings and continue his orthopedic treatment. If this does not resolve his symptoms we will see him back and discuss options for therapy and treatment. If you have any questions please feel free to contact me. Thanks for asked me to see him and please let me know if I can help you with any other patients in the future.     Khalif Barnes

## 2022-05-26 NOTE — PROGRESS NOTES
Seen back for varicose veins B legs. Reviewed symptoms again - primarily R leg with pain extending down lateral thigh into knee area. Has recently had steroid injection of R knee with some symptom improvement. Will have other nonsurgical treatment in the near future as possible via insurance plan. Pt has NOT been wearing the prescribed kh 20/30 mmHg stockings. No reported edema, bleeding, tender cord, skin changes or ulceration. Recent venous reflux scan performed on 5/11/2022 at [facility].    EXAM:  No edema, ulceration or dermatitis. All VVs soft, nontender without erythema. VRS - no R deep reflux and only L CFV reflux; no SSV reflux; B GSV ablation with B \"short segment\" GSV patency with   reflux; B mid-calf  reflux into VVs    A/P: Nonspecific leg pain - based on duplex finding & location of pain not likely venous in origin. Recommend aggressive orthopedic treatment of apparent osteoarthritis. Also reiterated the importance of stocking use as controlling venous disease and gauging symptom etiology. This was discussed in terms that the patient could understand. Observe with stocking use for now. RTO in 3 - 6 months if no improvement from ortho care. If needed with repeat reflux scan in my presence to   see the length of GSV reflux and estimate response to treatment at that time. Time spent counseling and coordination of care: 25 minutes. More than 50% of visit was spent reviewing and discussing venous duplex scan. He will begin [employer] daily compression stockings.

## 2022-06-03 DIAGNOSIS — R53.83 FATIGUE, UNSPECIFIED TYPE: ICD-10-CM

## 2022-06-03 DIAGNOSIS — R06.83 SNORES: ICD-10-CM

## 2022-06-03 DIAGNOSIS — Z12.5 PROSTATE CANCER SCREENING: ICD-10-CM

## 2022-06-03 DIAGNOSIS — Z00.00 WELL ADULT EXAM: ICD-10-CM

## 2022-06-03 LAB
A/G RATIO: 2.3 (ref 1.1–2.2)
ALBUMIN SERPL-MCNC: 4.5 G/DL (ref 3.4–5)
ALP BLD-CCNC: 100 U/L (ref 40–129)
ALT SERPL-CCNC: 31 U/L (ref 10–40)
ANION GAP SERPL CALCULATED.3IONS-SCNC: 14 MMOL/L (ref 3–16)
AST SERPL-CCNC: 16 U/L (ref 15–37)
BASOPHILS ABSOLUTE: 0 K/UL (ref 0–0.2)
BASOPHILS RELATIVE PERCENT: 0.2 %
BILIRUB SERPL-MCNC: 0.5 MG/DL (ref 0–1)
BUN BLDV-MCNC: 14 MG/DL (ref 7–20)
CALCIUM SERPL-MCNC: 9.2 MG/DL (ref 8.3–10.6)
CHLORIDE BLD-SCNC: 102 MMOL/L (ref 99–110)
CHOLESTEROL, TOTAL: 183 MG/DL (ref 0–199)
CO2: 23 MMOL/L (ref 21–32)
CREAT SERPL-MCNC: 0.7 MG/DL (ref 0.8–1.3)
EOSINOPHILS ABSOLUTE: 0.2 K/UL (ref 0–0.6)
EOSINOPHILS RELATIVE PERCENT: 3.2 %
GFR AFRICAN AMERICAN: >60
GFR NON-AFRICAN AMERICAN: >60
GLUCOSE BLD-MCNC: 79 MG/DL (ref 70–99)
HCT VFR BLD CALC: 47.8 % (ref 40.5–52.5)
HDLC SERPL-MCNC: 37 MG/DL (ref 40–60)
HEMOGLOBIN: 16 G/DL (ref 13.5–17.5)
LDL CHOLESTEROL CALCULATED: 126 MG/DL
LYMPHOCYTES ABSOLUTE: 2 K/UL (ref 1–5.1)
LYMPHOCYTES RELATIVE PERCENT: 27.4 %
MCH RBC QN AUTO: 30.1 PG (ref 26–34)
MCHC RBC AUTO-ENTMCNC: 33.5 G/DL (ref 31–36)
MCV RBC AUTO: 89.9 FL (ref 80–100)
MONOCYTES ABSOLUTE: 0.7 K/UL (ref 0–1.3)
MONOCYTES RELATIVE PERCENT: 10.1 %
NEUTROPHILS ABSOLUTE: 4.3 K/UL (ref 1.7–7.7)
NEUTROPHILS RELATIVE PERCENT: 59.1 %
PDW BLD-RTO: 13.4 % (ref 12.4–15.4)
PLATELET # BLD: 166 K/UL (ref 135–450)
PMV BLD AUTO: 8.9 FL (ref 5–10.5)
POTASSIUM SERPL-SCNC: 4.4 MMOL/L (ref 3.5–5.1)
PROSTATE SPECIFIC ANTIGEN: 0.39 NG/ML (ref 0–4)
RBC # BLD: 5.32 M/UL (ref 4.2–5.9)
SODIUM BLD-SCNC: 139 MMOL/L (ref 136–145)
TOTAL PROTEIN: 6.5 G/DL (ref 6.4–8.2)
TRIGL SERPL-MCNC: 102 MG/DL (ref 0–150)
TSH SERPL DL<=0.05 MIU/L-ACNC: 1.15 UIU/ML (ref 0.27–4.2)
VLDLC SERPL CALC-MCNC: 20 MG/DL
WBC # BLD: 7.2 K/UL (ref 4–11)

## 2022-06-22 ENCOUNTER — HOSPITAL ENCOUNTER (EMERGENCY)
Age: 62
Discharge: HOME | End: 2022-06-22
Attending: EMERGENCY MEDICINE
Payer: COMMERCIAL

## 2022-06-22 VITALS
TEMPERATURE: 98.3 F | RESPIRATION RATE: 16 BRPM | DIASTOLIC BLOOD PRESSURE: 83 MMHG | HEIGHT: 69 IN | HEART RATE: 88 BPM | OXYGEN SATURATION: 94 % | SYSTOLIC BLOOD PRESSURE: 139 MMHG | BODY MASS INDEX: 42.12 KG/M2 | WEIGHT: 284.39 LBS

## 2022-06-22 DIAGNOSIS — I83.891 BLEEDING FROM VARICOSE VEINS OF LOWER EXTREMITY, RIGHT: Primary | ICD-10-CM

## 2022-06-22 PROCEDURE — 99282 EMERGENCY DEPT VISIT SF MDM: CPT

## 2022-06-22 ASSESSMENT — PAIN - FUNCTIONAL ASSESSMENT: PAIN_FUNCTIONAL_ASSESSMENT: 0-10

## 2022-06-22 ASSESSMENT — PAIN SCALES - GENERAL: PAINLEVEL_OUTOF10: 0

## 2022-06-23 NOTE — ED PROVIDER NOTES
[facility]  eMERGENCY dEPARTMENT eNCOUnter      Pt Name: Evan Strickland  MRN: [id number]  [place] 1960  Date of evaluation: 6/22/2022  Provider: Kaylie Meyer MD    CHIEF COMPLAINT     No chief complaint on file. HISTORY OF PRESENT ILLNESS  (Location/Symptom, Timing/Onset, Context/Setting, Quality, Duration, Modifying Factors, Severity.)   Evan Strickland is a 65 y.o. male who presents to the emergency department complaining of bleeding from his right lower leg that started suddenly at home just prior to coming in. No injury. Nursing Notes were reviewed and I agree. REVIEW OF SYSTEMS    (2-9 systems for level 4, 10 or more for level 5)     Skin: Bleeding from right lower leg. He has a lot of varicose veins. Except as noted above the remainder of the review of systems was reviewed and negative.        PAST MEDICAL HISTORY         Diagnosis Date    GERD (gastroesophageal reflux disease)     Hyperlipidemia        SURGICAL HISTORY           Procedure Laterality Date    COLONOSCOPY  11/28/2012    normal dr Esther murphy 10 years    COLONOSCOPY  08/25/2017    normal dr Deluca Ravi    EYE SURGERY Bilateral 03/2018    relieve pressure in eyes    FOOT SURGERY      left    KNEE CARTILAGE SURGERY      left    VARICOSE VEIN SURGERY Bilateral 11/2017       CURRENT MEDICATIONS       Previous Medications    No medications on file       ALLERGIES     Sulfa antibiotics    FAMILY HISTORY           Problem Relation Age of Onset    Cancer Father 79        prostate/throat/lung   [place] Self High Cholesterol Mother     Heart Attack Mother         mild    COPD Mother     No Known Problems Sister     Diabetes Brother     No Known Problems Maternal Grandmother     Heart Disease Maternal Grandfather     Heart Attack Paternal Grandmother     No Known Problems Paternal Grandfather     No Known Problems Sister     No Known Problems Sister     No Known Problems Brother     No Known Problems Brother     No Known Problems Brother     No Known Problems Brother     No Known Problems Brother     No Known Problems Brother      Family Status   Relation Name Status    Father      Mother      Sister Edilma Brooks Alive    Brother Sam Phan    MGM      MGF      PGM      PGF      Sister Isha Alive    Sister Sydni Alive    Brother Wilder Alive    Brother . [employer]    Brother Edward Alive    Brother Jonathan Alive    Brother Medardo Alive    Brother DJessicaRJessica [employer], [employer]        SOCIAL HISTORY      reports that he quit smoking about 29 years ago. His smoking use included cigarettes. He started smoking about 44 years ago. He has a 7.50 pack-year smoking history. He has never used smokeless tobacco. He reports current alcohol use. He reports that he does not use drugs. PHYSICAL EXAM    (up to 7 for level 4, 8 or more for level 5)     ED Triage Vitals   BP Temp Temp src Pulse Resp SpO2 Height Weight   -- -- -- -- -- -- -- --       General: Alert morbidly obese white male in no acute distress. Musculoskeletal: No edema to the lower extremities. Intact range of motion of the knee ankle and foot. Skin: Numerous superficial varicosities of the right lower leg. He has a pinpoint area of bleeding over the medial distal tibia about three quarters the way down the right lower leg. It is venous bleeding. It is actively bleeding when he arrived. There is no erythema. Intact distal pulses.       DIAGNOSTIC RESULTS     RADIOLOGY:   Non-plain film images such as CT, Ultrasound and MRI are read by the radiologist. Plain radiographic images are visualized and preliminarily interpreted by Jennifer Hicks MD with the below findings:      Interpretation per the Radiologist below, if available at the time of this note:    No orders to display       LABS:  Labs Reviewed - No data to display    All other labs were within normal range or not returned as of this dictation. EMERGENCY DEPARTMENT COURSE and DIFFERENTIAL DIAGNOSIS/MDM:   Vitals: There were no vitals filed for this visit. This patient had a bleeding varicose vein. I ligated it with a single figure-of-eight suture. The bleeding had stopped. A dressing was placed. Leave the dressing on for 24 to 48 hours then removed. He can cover it with a Band-Aid during the day, leave it open to the air at night. Follow-up in 10 to 12 days to have the suture removed. PROCEDURES:  Ligation bleeding varicose vein:  Local anesthesia 1% lidocaine plain injected. Prepped with Hibiclens and saline. A single 5-0 nylon figure-of-eight suture was placed around the site of bleeding in the varicosity. Good hemostasis with no further bleeding. Patient tolerated procedure well without complication. Dressing was placed. FINAL IMPRESSION      1.  Bleeding from varicose veins of lower extremity, right          DISPOSITION/PLAN   DISPOSITION Decision To Discharge 06/22/2022 08:13:21 PM      PATIENT REFERRED TO:  Loy Davis MD  [address]  [phone]    In 10 days  For suture removal      DISCHARGE MEDICATIONS:  New Prescriptions    No medications on file       (Please note that portions of this note were completed with a voice recognition program.  Efforts were made to edit the dictations but occasionally words are mis-transcribed.)    Brooks Garcia MD  Attending Emergency Physician        Elle Jaramillo MD  06/22/22 2017

## 2022-06-23 NOTE — ED NOTES
MD De Leon Gram to bedside/ sutured bleeding area/ dressing applied/ dry and intact/ extra drsg supplies given to pt w care instructions.       Lon Llanos RN  06/22/22 2020

## 2022-07-01 ENCOUNTER — OFFICE VISIT (OUTPATIENT)
Dept: FAMILY MEDICINE CLINIC | Age: 62
End: 2022-07-01
Payer: COMMERCIAL

## 2022-07-01 VITALS
WEIGHT: 280 LBS | DIASTOLIC BLOOD PRESSURE: 82 MMHG | TEMPERATURE: 98.6 F | HEIGHT: 69 IN | BODY MASS INDEX: 41.47 KG/M2 | SYSTOLIC BLOOD PRESSURE: 128 MMHG

## 2022-07-01 DIAGNOSIS — Z48.02 ENCOUNTER FOR REMOVAL OF SUTURES: Primary | ICD-10-CM

## 2022-07-01 PROCEDURE — 99212 OFFICE O/P EST SF 10 MIN: CPT | Performed by: FAMILY MEDICINE

## 2022-07-01 ASSESSMENT — PATIENT HEALTH QUESTIONNAIRE - PHQ9
2. FEELING DOWN, DEPRESSED OR HOPELESS: 0
SUM OF ALL RESPONSES TO PHQ QUESTIONS 1-9: 0
SUM OF ALL RESPONSES TO PHQ9 QUESTIONS 1 & 2: 0
1. LITTLE INTEREST OR PLEASURE IN DOING THINGS: 0

## 2022-07-01 NOTE — PROGRESS NOTES
Subjective:      Patient ID: Keya Casillas is a 65 y.o. male. No chief complaint on file. Patient presents with:  Suture / Staple Removal: remove stitches on right lower leg    He had scratched the lower leg and nicked a varicose vein and would not stop bleeding so he went to [facility] now  No pain no fever    YOB: 1960    Date of Visit:  7/1/2022     -- Sulfa Antibiotics -- Hives    No current outpatient medications on file. No current facility-administered medications for this visit.      -------------------------              07/01/22                    1125        -------------------------   BP:         128/82        Site:   Left Upper Arm    Position:    Sitting       Cuff Size:  Large Adult     Temp:   98.6 °F (37 °C)   TempSrc:   Temporal       Weight: 280 lb (127 kg)   Height: 5' 9\" (1.753 m)  -------------------------  Body mass index is 41.35 kg/m². Wt Readings from Last 3 Encounters:  07/01/22 : 280 lb (127 kg)  06/22/22 : 284 lb 6.3 oz (129 kg)  05/26/22 : 282 lb (127.9 kg)    BP Readings from Last 3 Encounters:  07/01/22 : 128/82  06/22/22 : 139/83  04/27/22 : 118/80        Review of Systems    Objective:   Physical Exam  Constitutional:       General: He is not in acute distress. Appearance: Normal appearance. He is not ill-appearing. Skin:     General: Skin is warm and dry. Coloration: Skin is not pale. Comments: Single suture removed easily on the right lower leg above the medial ankle  No inflammation. The area oozed when the suture removed. Stopped with simple pressure  Dressed with gauze and large bandaid. Doing well    Neurological:      Mental Status: He is alert. Assessment:       Diagnosis Orders   1.  Encounter for removal of sutures       Doing well       Plan:      Keep the area clean with soap and water   Do not scratch the area  Ok to remove the band-aid tomorrow        Yari Vargas MD

## 2022-07-01 NOTE — PATIENT INSTRUCTIONS
Keep the area clean with soap and water   Do not scratch the area  Ok to remove the band-aid tomorrow

## 2022-10-24 ENCOUNTER — OFFICE VISIT (OUTPATIENT)
Dept: FAMILY MEDICINE CLINIC | Age: 62
End: 2022-10-24
Payer: COMMERCIAL

## 2022-10-24 VITALS
BODY MASS INDEX: 41.2 KG/M2 | SYSTOLIC BLOOD PRESSURE: 126 MMHG | DIASTOLIC BLOOD PRESSURE: 72 MMHG | TEMPERATURE: 98.3 F | HEIGHT: 69 IN | WEIGHT: 278.2 LBS

## 2022-10-24 DIAGNOSIS — B96.89 ACUTE BACTERIAL SINUSITIS: Primary | ICD-10-CM

## 2022-10-24 DIAGNOSIS — J01.90 ACUTE BACTERIAL SINUSITIS: Primary | ICD-10-CM

## 2022-10-24 PROCEDURE — 99213 OFFICE O/P EST LOW 20 MIN: CPT | Performed by: INTERNAL MEDICINE

## 2022-10-24 RX ORDER — CEFUROXIME AXETIL 250 MG/1
250 TABLET ORAL 2 TIMES DAILY
Qty: 20 TABLET | Refills: 0 | Status: SHIPPED | OUTPATIENT
Start: 2022-10-24 | End: 2022-11-03

## 2022-10-24 ASSESSMENT — ENCOUNTER SYMPTOMS
SINUS PAIN: 1
ABDOMINAL PAIN: 0
RHINORRHEA: 1
WHEEZING: 0
SINUS PRESSURE: 1
CHEST TIGHTNESS: 0
SHORTNESS OF BREATH: 0
COUGH: 1

## 2022-10-24 NOTE — PATIENT INSTRUCTIONS
Thank you for choosing [facility]. Please bring a current list of medications to every appointment. Please contact your pharmacy for any prescription refill(s) that you are requesting.

## 2022-10-24 NOTE — PROGRESS NOTES
Michelle Pryor (:  1960) is a 65 y.o. male,Established patient, here for evaluation of the following chief complaint(s):  No chief complaint on file. Chief Complaint   Patient presents with    Sinusitis     Patient c/o sinus infection x 2 weeks; productive cough, chest congestion, head congestion, sore throat from coughing. Patient denies fever and has taken OTC sinus meds, Tylenol and Ibuprofen           ASSESSMENT/PLAN:   Diagnosis Orders   1. Acute bacterial sinusitis           Outpatient Encounter Medications as of 10/24/2022   Medication Sig Dispense Refill    cefUROXime (CEFTIN) 250 MG tablet Take 1 tablet by mouth 2 times daily for 10 days 20 tablet 0     No facility-administered encounter medications on file as of 10/24/2022. No orders of the defined types were placed in this encounter. Subjective   SUBJECTIVE/OBJECTIVE:  HPI    Review of Systems   Constitutional:  Negative for chills, diaphoresis, fatigue and fever. HENT:  Positive for congestion, rhinorrhea, sinus pressure and sinus pain. Respiratory:  Positive for cough. Negative for chest tightness, shortness of breath and wheezing. Cardiovascular:  Negative for chest pain and palpitations. Gastrointestinal:  Negative for abdominal pain. Genitourinary:  Negative for dysuria, frequency and hematuria. Musculoskeletal:  Negative for arthralgias and myalgias. Neurological:  Negative for dizziness, numbness and headaches. Hematological:  Negative for adenopathy. Objective   Physical Exam  Vitals and nursing note reviewed. Constitutional:       Appearance: Normal appearance. HENT:      Head: Normocephalic and atraumatic. Comments: Edema bilat. nasal turbinates with drainage . Cardiovascular:      Rate and Rhythm: Normal rate and regular rhythm. Skin:     Coloration: Skin is not jaundiced. Findings: No bruising. Neurological:      General: No focal deficit present.       Mental Status: He is alert and oriented to person, place, and time. Cranial Nerves: No cranial nerve deficit. Motor: No weakness.                 An electronic signature was used to authenticate this note.    --Ann-Marie Alvarez, DO

## 2023-01-03 ENCOUNTER — OFFICE VISIT (OUTPATIENT)
Dept: FAMILY MEDICINE CLINIC | Age: 63
End: 2023-01-03
Payer: COMMERCIAL

## 2023-01-03 ENCOUNTER — HOSPITAL ENCOUNTER (OUTPATIENT)
Age: 63
Discharge: HOME | End: 2023-01-03
Payer: COMMERCIAL

## 2023-01-03 ENCOUNTER — HOSPITAL ENCOUNTER (OUTPATIENT)
Dept: GENERAL RADIOLOGY | Age: 63
Discharge: HOME | End: 2023-01-03
Payer: COMMERCIAL

## 2023-01-03 VITALS
BODY MASS INDEX: 40.73 KG/M2 | OXYGEN SATURATION: 98 % | WEIGHT: 275 LBS | HEART RATE: 80 BPM | HEIGHT: 69 IN | RESPIRATION RATE: 20 BRPM | TEMPERATURE: 97.4 F | SYSTOLIC BLOOD PRESSURE: 120 MMHG | DIASTOLIC BLOOD PRESSURE: 84 MMHG

## 2023-01-03 DIAGNOSIS — R05.1 ACUTE COUGH: Primary | ICD-10-CM

## 2023-01-03 DIAGNOSIS — R07.89 ATYPICAL CHEST PAIN: ICD-10-CM

## 2023-01-03 DIAGNOSIS — R06.02 SOB (SHORTNESS OF BREATH): ICD-10-CM

## 2023-01-03 DIAGNOSIS — R05.1 ACUTE COUGH: ICD-10-CM

## 2023-01-03 LAB
INFLUENZA A ANTIGEN, POC: NEGATIVE
INFLUENZA B ANTIGEN, POC: NEGATIVE
LOT EXPIRE DATE: NORMAL
LOT KIT NUMBER: NORMAL
SARS-COV-2, POC: NORMAL
VALID INTERNAL CONTROL: YES
VENDOR AND KIT NAME POC: NORMAL

## 2023-01-03 PROCEDURE — 87428 SARSCOV & INF VIR A&B AG IA: CPT | Performed by: FAMILY MEDICINE

## 2023-01-03 PROCEDURE — 99213 OFFICE O/P EST LOW 20 MIN: CPT | Performed by: FAMILY MEDICINE

## 2023-01-03 PROCEDURE — 93000 ELECTROCARDIOGRAM COMPLETE: CPT | Performed by: FAMILY MEDICINE

## 2023-01-03 PROCEDURE — 71046 X-RAY EXAM CHEST 2 VIEWS: CPT

## 2023-01-03 RX ORDER — DOXYCYCLINE HYCLATE 100 MG
100 TABLET ORAL 2 TIMES DAILY
Qty: 20 TABLET | Refills: 0 | Status: SHIPPED | OUTPATIENT
Start: 2023-01-03 | End: 2023-01-13

## 2023-01-03 SDOH — ECONOMIC STABILITY: TRANSPORTATION INSECURITY
IN THE PAST 12 MONTHS, HAS LACK OF TRANSPORTATION KEPT YOU FROM MEETINGS, WORK, OR FROM GETTING THINGS NEEDED FOR DAILY LIVING?: NO

## 2023-01-03 SDOH — ECONOMIC STABILITY: TRANSPORTATION INSECURITY
IN THE PAST 12 MONTHS, HAS THE LACK OF TRANSPORTATION KEPT YOU FROM MEDICAL APPOINTMENTS OR FROM GETTING MEDICATIONS?: NO

## 2023-01-03 SDOH — ECONOMIC STABILITY: FOOD INSECURITY: WITHIN THE PAST 12 MONTHS, THE FOOD YOU BOUGHT JUST DIDN'T LAST AND YOU DIDN'T HAVE MONEY TO GET MORE.: NEVER TRUE

## 2023-01-03 SDOH — ECONOMIC STABILITY: FOOD INSECURITY: WITHIN THE PAST 12 MONTHS, YOU WORRIED THAT YOUR FOOD WOULD RUN OUT BEFORE YOU GOT MONEY TO BUY MORE.: NEVER TRUE

## 2023-01-03 ASSESSMENT — PATIENT HEALTH QUESTIONNAIRE - PHQ9
SUM OF ALL RESPONSES TO PHQ QUESTIONS 1-9: 0
SUM OF ALL RESPONSES TO PHQ9 QUESTIONS 1 & 2: 0
SUM OF ALL RESPONSES TO PHQ QUESTIONS 1-9: 0
SUM OF ALL RESPONSES TO PHQ QUESTIONS 1-9: 0
1. LITTLE INTEREST OR PLEASURE IN DOING THINGS: 0
2. FEELING DOWN, DEPRESSED OR HOPELESS: 0
SUM OF ALL RESPONSES TO PHQ QUESTIONS 1-9: 0

## 2023-01-03 ASSESSMENT — SOCIAL DETERMINANTS OF HEALTH (SDOH): HOW HARD IS IT FOR YOU TO PAY FOR THE VERY BASICS LIKE FOOD, HOUSING, MEDICAL CARE, AND HEATING?: NOT HARD AT ALL

## 2023-01-03 NOTE — PROGRESS NOTES
Subjective:      Patient ID: Andreina Ayala is a 65 y.o. male. Chief Complaint   Patient presents with    Congestion     Sinus, dry cough x 3 days    Chest Pain     Fluttering in chest    Shortness of Breath     When walking        Patient presents with:  Congestion: Sinus, dry cough x 3 days  Chest Pain: Fluttering in chest  Shortness of Breath: When walking    Here for the above  He is having a productive cough  No fever  Right sided chest pain  Pressure. Comes and goes  He notes it when he coughs  No pain to breath in  Notes occ palpitation  No sore throat no ear pain     No tobacco use  No heart disease in family   No early heart attacks    Little sob as well with this  No exposure  Some muscle aches    YOB: 1960    Date of Visit:  1/3/2023     -- Sulfa Antibiotics -- Hives    No current outpatient medications on file. No current facility-administered medications for this visit.    ---------------------------               01/03/23                      1638         ---------------------------   BP:          120/84         Site:    Left Upper Arm     Position:     Sitting        Cuff Size:   Large Adult      Pulse:         80           Temp:   97.4 °F (36.3 °C)   TempSrc:    Temporal        SpO2:          98%          Weight: 275 lb (124.7 kg)   Height:  5' 9\" (1.753 m)   ---------------------------  Wt Readings from Last 3 Encounters:  01/03/23 : 275 lb (124.7 kg)  10/24/22 : 278 lb 3.2 oz (126.2 kg)  07/01/22 : 280 lb (127 kg)    BP Readings from Last 3 Encounters:  01/03/23 : 120/84  10/24/22 : 126/72  07/01/22 : 128/82          Review of Systems    Objective:   Physical Exam  Constitutional:       General: He is not in acute distress. Appearance: Normal appearance. He is well-developed. He is not ill-appearing or diaphoretic. HENT:      Head: Normocephalic and atraumatic.       Right Ear: Tympanic membrane and ear canal normal.      Left Ear: Tympanic membrane and ear canal normal.      Nose: Nose normal.      Mouth/Throat:      Lips: Pink. Mouth: Mucous membranes are moist. No oral lesions. Pharynx: Oropharynx is clear. Uvula midline. Cardiovascular:      Rate and Rhythm: Normal rate and regular rhythm. Heart sounds: Normal heart sounds. No murmur heard. No friction rub. No gallop. Pulmonary:      Effort: Pulmonary effort is normal. No tachypnea, accessory muscle usage or respiratory distress. Breath sounds: Normal breath sounds. No decreased breath sounds, wheezing, rhonchi or rales. Chest:      Comments: No pain to palpate the chest   Musculoskeletal:      Cervical back: Neck supple. Lymphadenopathy:      Head:      Right side of head: No submental or submandibular adenopathy. Left side of head: No submental or submandibular adenopathy. Cervical: No cervical adenopathy. Upper Body:      Right upper body: No supraclavicular adenopathy. Left upper body: No supraclavicular adenopathy. Skin:     General: Skin is warm and dry. Coloration: Skin is not pale. Neurological:      Mental Status: He is alert. Assessment:       Diagnosis Orders   1. Acute cough  [place] NONINVASIVE EAR/PULSE OXIMETRY SINGLE DETER    POCT COVID-19 & Influenza A/B    XR CHEST (2 VW)      2. SOB (shortness of breath)  [place] NONINVASIVE EAR/PULSE OXIMETRY SINGLE DETER    XR CHEST (2 VW)      3. Atypical chest pain  EKG 12 Lead - Clinic Performed    [place] NONINVASIVE EAR/PULSE OXIMETRY SINGLE DETER        Orders Placed This Encounter   Medications    doxycycline hyclate (VIBRA-TABS) 100 MG tablet     Sig: Take 1 tablet by mouth 2 times daily for 10 days     Dispense:  20 tablet     Refill:  0       Ekg here sr and no ischemia.  No change from ekg in 2017  Negative for covid and flu here  He appears well and exam is fine      Plan:      Take the antibiotic  If worse go to the ER  See us in about 10 days          Claudetta Mam, MD

## 2023-01-12 ENCOUNTER — OFFICE VISIT (OUTPATIENT)
Dept: FAMILY MEDICINE CLINIC | Age: 63
End: 2023-01-12
Payer: COMMERCIAL

## 2023-01-12 VITALS
HEIGHT: 69 IN | DIASTOLIC BLOOD PRESSURE: 80 MMHG | SYSTOLIC BLOOD PRESSURE: 138 MMHG | TEMPERATURE: 97.9 F | WEIGHT: 279 LBS | BODY MASS INDEX: 41.32 KG/M2

## 2023-01-12 DIAGNOSIS — R06.83 SNORES: ICD-10-CM

## 2023-01-12 DIAGNOSIS — R19.7 DIARRHEA OF PRESUMED INFECTIOUS ORIGIN: Primary | ICD-10-CM

## 2023-01-12 DIAGNOSIS — R06.09 DOE (DYSPNEA ON EXERTION): ICD-10-CM

## 2023-01-12 PROCEDURE — 99213 OFFICE O/P EST LOW 20 MIN: CPT | Performed by: FAMILY MEDICINE

## 2023-01-12 NOTE — PROGRESS NOTES
Subjective:      Patient ID: Lanette De La Rosa is a 65 y.o. male. Chief Complaint   Patient presents with    2 Week Follow-Up     Cough - feels better but feels like he has stomach bug         Patient presents with:  2 Week Follow-Up: Cough - feels better but feels like he has stomach bug     He is here for the above  He is better with the cough  Breathing is improved    No v he had diarrhea today and some stomach cramps  Daughter with same thing this week  No fever  No blood in stool     Eating and drinking ok     On further check he notes sob with exertion for 4 months     Snores. Wakes up tired      YOB: 1960    Date of Visit:  1/12/2023     -- Sulfa Antibiotics -- Hives    Current Outpatient Medications:  doxycycline hyclate (VIBRA-TABS) 100 MG tablet, Take 1 tablet by mouth 2 times daily for 10 days, Disp: 20 tablet, Rfl: 0    No current facility-administered medications for this visit.      ---------------------------               01/12/23                      1505         ---------------------------   BP:          138/80         Site:    Left Upper Arm     Position:     Sitting        Cuff Size:   Large Adult      Temp:   97.9 °F (36.6 °C)   TempSrc:    Temporal        Weight: 279 lb (126.6 kg)   Height:  5' 9\" (1.753 m)   ---------------------------  Body mass index is 41.2 kg/m². Wt Readings from Last 3 Encounters:  01/12/23 : 279 lb (126.6 kg)  01/03/23 : 275 lb (124.7 kg)  10/24/22 : 278 lb 3.2 oz (126.2 kg)    BP Readings from Last 3 Encounters:  01/12/23 : 138/80  01/03/23 : 120/84  10/24/22 : 126/72            Review of Systems    Objective:   Physical Exam  Constitutional:       General: He is not in acute distress. Appearance: Normal appearance. He is not ill-appearing. Abdominal:      General: Bowel sounds are normal. There is no abdominal bruit. Palpations: Abdomen is soft.  There is no hepatomegaly, splenomegaly, mass or pulsatile mass.      Tenderness: There is generalized abdominal tenderness. There is no guarding. Comments: No marks on the abdomen  He has mild generalized tender feeling    Skin:     General: Skin is warm and dry. Coloration: Skin is not pale. Neurological:      Mental Status: He is alert. Assessment:       Diagnosis Orders   1. Diarrhea of presumed infectious origin        2. Regis Anderson MD, Sleep Medicine, [facility]      3.  CASTRO (dyspnea on exertion)  Young Archibald MD, Cardiology, [facility]          He appears well  Ekg was fine   Cxr ok  Mother with hd in her 52's      Plan:      Do see the specialist for the sleep  See the cardiologist for the sob you have   Eat light take fluids  The diarrhea should clear   Call or go to the ER if worse        Grace Echeverria MD

## 2023-01-12 NOTE — PATIENT INSTRUCTIONS
Do see the specialist for the sleep  See the cardiologist for the sob you have   Eat light take fluids  The diarrhea should clear   Call or go to the ER if worse

## 2023-01-18 ENCOUNTER — OFFICE VISIT (OUTPATIENT)
Dept: CARDIOLOGY CLINIC | Age: 63
End: 2023-01-18

## 2023-01-18 VITALS
OXYGEN SATURATION: 97 % | SYSTOLIC BLOOD PRESSURE: 128 MMHG | DIASTOLIC BLOOD PRESSURE: 82 MMHG | WEIGHT: 273 LBS | BODY MASS INDEX: 40.43 KG/M2 | HEIGHT: 69 IN | HEART RATE: 98 BPM

## 2023-01-18 DIAGNOSIS — R29.818 SUSPECTED SLEEP APNEA: ICD-10-CM

## 2023-01-18 DIAGNOSIS — E66.01 MORBID OBESITY WITH BMI OF 40.0-44.9, ADULT (HCC): ICD-10-CM

## 2023-01-18 DIAGNOSIS — R60.0 LOCALIZED EDEMA: ICD-10-CM

## 2023-01-18 DIAGNOSIS — R06.09 DYSPNEA ON EXERTION: Primary | ICD-10-CM

## 2023-01-18 DIAGNOSIS — E78.2 MIXED HYPERLIPIDEMIA: ICD-10-CM

## 2023-01-18 NOTE — PROGRESS NOTES
[address]      Cardiology Consult    [facility]  1960    Referring Physician: Rudy Dudley MD  Reason for Referral: CASTRO    CC: \"I'm short of breath\"     HPI:  The patient is 59 y.o. male with a past medical history significant for hyperlipidemia and venous insufficiency who presents for evaluation of CASTRO. He reports worsening shortness of breath over the past month. He denies any associated chest pain. He seems to live a fairly sedentary life and does not participate in any formal exercise. He endorses snoring and has been referred for a sleep study. The shortness of breath does not limit his activity. He has chronic lower extremity edema that waxes and wanes. He previously underwent bilateral laser vein ablations. He is currently not on any medications. He denies depression but says he has generally been inactive since his wife  2 years ago. He denies any abnormal bleeding or bruising. He denies exertional chest pain/pressure, PND, orthopnea, palpitations, lightheadedness, and syncope.     Past Medical History:   Diagnosis Date    GERD (gastroesophageal reflux disease)     Hyperlipidemia      Past Surgical History:   Procedure Laterality Date    COLONOSCOPY  2012    normal dr Alfred dunbareck 10 years    COLONOSCOPY  2017    normal dr So Burger Bilateral 2018    relieve pressure in eyes    FOOT SURGERY      left    KNEE CARTILAGE SURGERY      left    VARICOSE VEIN SURGERY Bilateral 2017     Family History   Problem Relation Age of Onset    Cancer Father 79        prostate/throat/lung    High Cholesterol Mother     Heart Attack Mother         mild    COPD Mother     No Known Problems Sister     Diabetes Brother     No Known Problems Maternal Grandmother     Heart Disease Maternal Grandfather     Heart Attack Paternal Grandmother     No Known Problems Paternal Grandfather     No Known Problems Sister     No Known Problems Sister     No Known Problems Brother     No Known Problems Brother     No Known Problems Brother     No Known Problems Brother     No Known Problems Brother     No Known Problems Brother      Social History     Tobacco Use    Smoking status: Former     Packs/day: 0.50     Years: 15.00     Pack years: 7.50     Types: Cigarettes     Start date: 7/10/1977     Quit date: 7/10/1992     Years since quittin.5    Smokeless tobacco: Never   Vaping Use    Vaping Use: Never used   Substance Use Topics    Alcohol use: Not Currently     Comment: occasional    Drug use: No     Comment: quit 20 yrs ago     Allergies   Allergen Reactions    Sulfa Antibiotics Hives     No current outpatient medications on file. No current facility-administered medications for this visit. Review of Systems:  Constitutional: No unanticipated weight loss. There's been no change in energy level, sleep pattern, or activity level. No fevers, chills. Eyes: No visual changes or diplopia. No scleral icterus. ENT: No Headaches, hearing loss or vertigo. No mouth sores or sore throat. Cardiovascular: as reviewed in HPI  Respiratory: No cough or wheezing, no sputum production. No hemoptysis. Gastrointestinal: No abdominal pain, appetite loss, blood in stools. No change in bowel or bladder habits. Genitourinary: No dysuria, trouble voiding, or hematuria. Musculoskeletal:  No gait disturbance, no joint complaints. Integumentary: No rash or pruritis. Neurological: No headache, diplopia, change in muscle strength, numbness or tingling. Psychiatric: No anxiety or depression. Endocrine: No temperature intolerance. No excessive thirst, fluid intake, or urination. No tremor. Hematologic/Lymphatic: No abnormal bruising or bleeding, blood clots or swollen lymph nodes. Allergic/Immunologic: No nasal congestion or hives.     Physical Exam:   /82 (Site: Right Upper Arm, Position: Sitting)   Pulse 98   Ht 5' 9\" (1.753 m)   Wt 273 lb (123.8 kg)   SpO2 97%   BMI 40.32 kg/m²   Wt Readings from Last 3 Encounters:   01/18/23 273 lb (123.8 kg)   01/12/23 279 lb (126.6 kg)   01/03/23 275 lb (124.7 kg)     Constitutional: He is oriented to person, place, and time. He appears well-developed and well-nourished. In no acute distress. Head: Normocephalic and atraumatic. Pupils equal and round. Neck: Neck supple. No JVP or carotid bruit appreciated. No mass and no thyromegaly present. No lymphadenopathy present. Cardiovascular: Normal rate. Normal heart sounds. Exam reveals no gallop and no friction rub. No murmur heard. Pulmonary/Chest: Effort normal and breath sounds normal. No respiratory distress. He has no wheezes, rhonchi or rales. Abdominal: Soft, non-tender. Bowel sounds are normal. He exhibits no organomegaly, mass or bruit. Extremities:  1+ BLE edema. No cyanosis or clubbing. Pulses are 2+ radial/carotid bilaterally. Neurological: No gross cranial nerve deficit. Coordination normal.   Skin: Skin is warm and dry. There is no rash or diaphoresis. Psychiatric: He has a depressed mood and restricted affect. His speech is normal and behavior is normal.     Lab Review:   FLP:    Lab Results   Component Value Date/Time    TRIG 102 06/03/2022 11:39 AM    HDL 37 06/03/2022 11:39 AM    HDL 40 01/16/2010 08:54 AM    LDLCALC 126 06/03/2022 11:39 AM    LABVLDL 20 06/03/2022 11:39 AM     BUN/Creatinine:    Lab Results   Component Value Date/Time    BUN 14 06/03/2022 11:39 AM    CREATININE 0.7 06/03/2022 11:39 AM     The 10-year ASCVD risk score (Gracia ORELLANA, et al., 2019) is: 11.5%    Values used to calculate the score:      Age: 59 years      Sex: Male      Is Non- : No      Diabetic: No      Tobacco smoker: No      Systolic Blood Pressure: 695 mmHg      Is BP treated: No      HDL Cholesterol: 37 mg/dL      Total Cholesterol: 183 mg/dL    EKG Interpretation: 1/3/23 Sinus rhythm. Normal ECG.      Image Review:     Bilateral Venous Doppler 6/2/17  No evidence of deep or superficial venous thrombosis of the right lower   extremity. Severe valvular incompetence of the right common femoral vein,   saphenofemoral vein (reflux of 0.5 seconds), great saphenous vein(proximal   thigh to the mid calf) and a tributary of the great saphenous vein to a   varicose vein at zone 4.5 demonstrating reflux of 1.3 seconds and a diameter   of 3.6 mm. No evidence of deep or superficial venous thrombosis of the left lower   extremity. Severe valvular incompetence of the left common femoral vein, saphenofemoral   vein (reflux of 1.6 seconds) and the great saphenous vein(proximal thigh to   the proximal calf). Stress Test 8/24/17  Negative treadmill exercise stress test.  The patient ex. for 6 min. on the Bharath protocol to achieve a HR of 151 which is 92 % of PMHR. No Chest pain or ischemic ST changes. Bilateral Venous Doppler 5/16/22  RIGHT:   No evidence of deep or superficial venous thrombosis. Isolated superficial venous insufficiency. LEFT:   No evidence of deep or superficial venous thrombosis. Isolated superficial venous insufficiency. Assessment/Plan:     1) Dyspnea on exertion. Uncertain etiology. Cannot rule out deconditioning and obesity being part of the issue. Possible anginal equivalent so will further risk stratify with an echocardiogram and plain GXT stress test. If normal, encouraged exercise, weight loss, and follow up with PCP. 2) Hyperlipidemia.  (10/2022). 10 year CVD risk is 11.5% and chronic statin therapy is indicated. Would suggest starting Lipitor 10mg daily but will defer to his PCP as patient may not require longitudinal follow-up. 3) Edema/Chronic venous insufficiency. S/p ablation of left great saphenous vein 10/2017. Following with vascular surgery. Will obtain an echocardiogram with now having CASTRO as well. 4) Morbid obesity/suspected sleep apnea. BMI 40.3. Encouraged weight loss.  Referral to sleep medicine seems appropriate. Follow up in office as needed if stress test and echo without significant findings. Thank you very much for allowing me to participate in the care of your patient. Please do not hesitate to contact me if you have any questions. Sincerely,  Graciela Monge. Brent Suarez MD      [facility], [address] [place] [address]  Ph: [phone]  Fax: [phone]    This note was scribed in the presence of Dr Brent Suarez MD by Keesha Maria RN. Physician Attestation: The scribes documentation has been prepared under my direction and personally reviewed by me in its entirety. I confirm that the note above accurately reflects all work, treatment, procedures, and medical decision making performed by me. All portions of the note including but not limited to the chief complaint, history of present illness, physical exam, assessment and plan/medical decision making were personally reviewed, edited, and updated on the day of the visit.

## 2023-01-23 ENCOUNTER — OFFICE VISIT (OUTPATIENT)
Dept: SLEEP MEDICINE | Age: 63
End: 2023-01-23
Payer: COMMERCIAL

## 2023-01-23 VITALS
BODY MASS INDEX: 39.4 KG/M2 | RESPIRATION RATE: 18 BRPM | DIASTOLIC BLOOD PRESSURE: 70 MMHG | TEMPERATURE: 97.7 F | HEIGHT: 70 IN | HEART RATE: 85 BPM | WEIGHT: 275.2 LBS | SYSTOLIC BLOOD PRESSURE: 100 MMHG | OXYGEN SATURATION: 98 %

## 2023-01-23 DIAGNOSIS — R06.89 GASPING FOR BREATH: ICD-10-CM

## 2023-01-23 DIAGNOSIS — G47.33 OSA (OBSTRUCTIVE SLEEP APNEA): Primary | ICD-10-CM

## 2023-01-23 DIAGNOSIS — E66.09 CLASS 2 OBESITY DUE TO EXCESS CALORIES WITHOUT SERIOUS COMORBIDITY WITH BODY MASS INDEX (BMI) OF 39.0 TO 39.9 IN ADULT: ICD-10-CM

## 2023-01-23 DIAGNOSIS — R06.83 SNORING: ICD-10-CM

## 2023-01-23 DIAGNOSIS — G47.19 EXCESSIVE DAYTIME SLEEPINESS: ICD-10-CM

## 2023-01-23 PROCEDURE — 99204 OFFICE O/P NEW MOD 45 MIN: CPT | Performed by: PSYCHIATRY & NEUROLOGY

## 2023-01-23 ASSESSMENT — SLEEP AND FATIGUE QUESTIONNAIRES
HOW LIKELY ARE YOU TO NOD OFF OR FALL ASLEEP WHILE WATCHING TV: 2
HOW LIKELY ARE YOU TO NOD OFF OR FALL ASLEEP WHILE SITTING QUIETLY AFTER LUNCH WITHOUT ALCOHOL: 3
HOW LIKELY ARE YOU TO NOD OFF OR FALL ASLEEP WHEN YOU ARE A PASSENGER IN A CAR FOR AN HOUR WITHOUT A BREAK: 0
HOW LIKELY ARE YOU TO NOD OFF OR FALL ASLEEP WHILE SITTING INACTIVE IN A PUBLIC PLACE: 3
HOW LIKELY ARE YOU TO NOD OFF OR FALL ASLEEP WHILE SITTING AND TALKING TO SOMEONE: 0
HOW LIKELY ARE YOU TO NOD OFF OR FALL ASLEEP WHILE SITTING AND READING: 2
ESS TOTAL SCORE: 10
HOW LIKELY ARE YOU TO NOD OFF OR FALL ASLEEP WHILE LYING DOWN TO REST IN THE AFTERNOON WHEN CIRCUMSTANCES PERMIT: 0
NECK CIRCUMFERENCE (INCHES): 17
HOW LIKELY ARE YOU TO NOD OFF OR FALL ASLEEP IN A CAR, WHILE STOPPED FOR A FEW MINUTES IN TRAFFIC: 0

## 2023-01-23 ASSESSMENT — ENCOUNTER SYMPTOMS
CHOKING: 1
ALLERGIC/IMMUNOLOGIC NEGATIVE: 1
EYES NEGATIVE: 1
SORE THROAT: 1

## 2023-01-23 NOTE — PATIENT INSTRUCTIONS
Orders Placed This Encounter   Procedures    Sleep Study with PAP Titration     Standing Status:   Future     Standing Expiration Date:   1/23/2024     Order Specific Question:   Sleep Study Titration Type     Answer:   CPAP     Order Specific Question:   Location For Sleep Study     Answer:   [facility]     Order Specific Question:   Select Sleep Lab Location     [facility]     Standing Status:   Future     Standing Expiration Date:   1/23/2024     Order Specific Question:   Location For Sleep Study     Answer:   [facility]     Order Specific Question:   Select Sleep Lab Location     Answer:   [facility]

## 2023-01-23 NOTE — PROGRESS NOTES
MD ANGELA Wesley Board Certified in Sleep Medicine  Certified [facility] Sleep Medicine  Board Certified in Neurology [address] [facility] [address] [place], [place] [address] ([address] [facility]  [address], [address] [facility]           [address] [id number] [phone]    Subjective:     Patient ID: Erick Salamanca is a 59 y.o. male. Chief Complaint   Patient presents with    Establish Care    Snoring         HPI:        Erick Salamanca is a 59 y.o. male referred by Dr. Amy Smith for a sleep evaluation. He complains of snoring, snorting, choking, tossing and turning, excessive daytime sleepiness, feels sleepy during the day, take naps during the day but he denies knees buckling with laughing, completely or partially paralyzed while falling asleep or waking up, noisy environment, uncomfortable room temperature, uncomfortable bedding. Symptoms began several years ago, gradually worsening since that time. The patient's bed-partner confirmed the snoring without the stopped breathing at night. SLEEP SCHEDULE: Goes to bed around 9 PM in the weekdays and 10 PM in the weekends. It usually takes the patient 15 minutes to fall asleep. The patient gets up 2-3 per night to go to the bathroom. The Patient finally gets up at 3:15 AM during the weekdays and 5 AM in the weekends. patient wakes up with dry mouth and sometimes morning headache. . the headache usually dull headache. The patient has restless sleep with frequent arousals in addition to the Patient has significant daytime sleepiness. The Patient scored Lyons Sleepiness Score: 10 on Lyons Sleepiness Scale ( more than 10 is indicative of daytime sleepiness)and 39 in fatigue scale ( more than 36 is indicative of daytime fatigue).  The patient takes daily nap for 20-60 minutes and usually is  refreshing nap. Previous evaluation and treatment has included- none. The Patient has been obese for many years and tried, has gained 25 in the last 5 years,  unsuccessfully to lose weight through diet, exercise. DOT/CDL - N/A  NESSA/Eren - N/A      Previous Report(s) Reviewed: historical medical records       Social History     Socioeconomic History    Marital status:      Spouse name: Not on file    Number of children: Not on file    Years of education: Not on file    Highest education level: Not on file   Occupational History    Not on file   Tobacco Use    Smoking status: Former     Packs/day: 0.50     Years: 15.00     Pack years: 7.50     Types: Cigarettes     Start date: 7/10/1977     Quit date: 7/10/1992     Years since quittin.5     Passive exposure: Past    Smokeless tobacco: Never   Vaping Use    Vaping Use: Never used   Substance and Sexual Activity    Alcohol use: Not Currently     Comment: occasional    Drug use: No     Comment: quit 20 yrs ago    Sexual activity: Yes     Partners: Female   Other Topics Concern    Not on file   Social History Narrative    Not on file     Social Determinants of Health     Financial Resource Strain: Low Risk     Difficulty of Paying Living Expenses: Not hard at all   Food Insecurity: No Food Insecurity    Worried About Running Out of Food in the Last Year: Never true    [address] in the Last Year: Never true   Transportation Needs: No Transportation Needs    Lack of Transportation (Medical): No    Lack of Transportation (Non-Medical):  No   Physical Activity: Not on file   Stress: Not on file   Social Connections: Not on file   Intimate Partner Violence: Not on file   Housing Stability: Not on file       Prior to Admission medications    Not on File       Allergies as of 2023 - Fully Reviewed 2023   Allergen Reaction Noted    Sulfa antibiotics Hives 2017       Patient Active Problem List Diagnosis    Obesity (BMI 30-39. 9)    Bronchitis    Cough    Hypercholesterolemia    Chronic venous insufficiency    Varicose veins of both lower extremities with complications    Abnormal weight gain    Asymptomatic varicose veins    Elevated blood pressure reading without diagnosis of hypertension    Left knee DJD    Chronic bilateral low back pain       Past Medical History:   Diagnosis Date    GERD (gastroesophageal reflux disease)     Hyperlipidemia        Past Surgical History:   Procedure Laterality Date    COLONOSCOPY  11/28/2012    normal dr Earl Tsang recheck 10 years    COLONOSCOPY  08/25/2017    normal dr Kayla Lizama Bilateral 03/2018    relieve pressure in eyes    FOOT SURGERY      left    KNEE CARTILAGE SURGERY      left    VARICOSE VEIN SURGERY Bilateral 11/2017       Family History   Problem Relation Age of Onset    Cancer Father 79        prostate/throat/lung    High Cholesterol Mother     Heart Attack Mother         mild    COPD Mother     No Known Problems Sister     Diabetes Brother     No Known Problems Maternal Grandmother     Heart Disease Maternal Grandfather     Heart Attack Paternal Grandmother     No Known Problems Paternal Grandfather     No Known Problems Sister     No Known Problems Sister     No Known Problems Brother     No Known Problems Brother     No Known Problems Brother     No Known Problems Brother     No Known Problems Brother     No Known Problems Brother        Review of Systems   Constitutional:  Positive for diaphoresis and fatigue. HENT:  Positive for congestion and sore throat. Eyes: Negative. Respiratory:  Positive for choking. Cardiovascular:  Positive for leg swelling. Endocrine: Negative. Genitourinary:  Positive for frequency. Musculoskeletal:  Positive for arthralgias and myalgias. Allergic/Immunologic: Negative. Neurological:  Positive for headaches. Hematological: Negative. Psychiatric/Behavioral: Negative.        Objective: Vitals:  Weight BMI Neck circumference    Wt Readings from Last 3 Encounters:   01/23/23 275 lb 3.2 oz (124.8 kg)   01/18/23 273 lb (123.8 kg)   01/12/23 279 lb (126.6 kg)    Body mass index is 39.49 kg/m². Neck circumference (Inches): 17     BP HR SaO2   BP Readings from Last 3 Encounters:   01/23/23 100/70   01/18/23 128/82   01/12/23 138/80    Pulse Readings from Last 3 Encounters:   01/23/23 85   01/18/23 98   01/03/23 80    SpO2 Readings from Last 3 Encounters:   01/23/23 98%   01/18/23 97%   01/03/23 98%        The mandibular molar Class :   [x]1 []2 []3      Mallampati I Airway Classification:   []1 [x]2 []3 []4        Physical Exam  Vitals and nursing note reviewed. Constitutional:       Appearance: Normal appearance. He is obese. HENT:      Head: Atraumatic. Nose: Nose normal.      Mouth/Throat:      Comments: Mallampati class 2, no retrognathia or hypognathia , normal airflow in bilateral nostrils, no septum deviation , oropharynx is rod, no tonsils enlargement. Eyes:      Extraocular Movements: Extraocular movements intact. Cardiovascular:      Rate and Rhythm: Normal rate and regular rhythm. Pulmonary:      Effort: Pulmonary effort is normal.      Breath sounds: Normal breath sounds. Musculoskeletal:      Right lower leg: No edema. Left lower leg: No edema. Neurological:      Mental Status: Mental status is at baseline. Psychiatric:         Mood and Affect: Mood normal.       Assessment:   Obstructive sleep apnea especially with snoring, snorting, daytime sleepiness, large neck circumference, Mallampati class of 2 and obesity. Diagnosis Orders   1. HARSHAD (obstructive sleep apnea)  Sleep Study with PAP Titration    Home Sleep Study      2. Class 2 obesity due to excess calories without serious comorbidity with body mass index (BMI) of 39.0 to 39.9 in adult        3. Snoring        4. Excessive daytime sleepiness        5.  Gasping for breath          Plan: Patient was counseled about the pathophysiology of obstructive sleep apnea syndrome and the methods for evaluating its presence and severity. Patient was counseled to avoid driving and other potentially hazardous circumstances if the patient is experiencing excessive sleepiness. Treatment considerations include the use of nasal CPAP, oral dental appliance or a surgical intervention, which should be based on otolarygologic findings, In the meantime, the patient should be cautioned to avoid the use of alcohol or other depressant medications because of potential for increasing the duration and severity of apnea and cautioned regarding driving or operating and dangerous equipment if the patient is experiencing daytime sleepiness. We discussed the proportionality between weight and AHI. With 10% weight change, the AHI has a 27% proportionate change. With 20% weight change, the AHI has a 45-50% proportionate change. Orders Placed This Encounter   Procedures    Sleep Study with PAP Titration    Home Sleep Study         Return for F/U between 31 and 90 days from the recieving CPAP.     Otto Carroll MD  Medical Director - [facility]

## 2023-01-25 ENCOUNTER — HOSPITAL ENCOUNTER (OUTPATIENT)
Dept: CARDIOLOGY | Age: 63
Discharge: HOME | End: 2023-01-25
Payer: COMMERCIAL

## 2023-01-25 LAB
LV EF: 58 %
LVEF MODALITY: NORMAL

## 2023-01-25 PROCEDURE — 93306 TTE W/DOPPLER COMPLETE: CPT

## 2023-01-25 PROCEDURE — 93017 CV STRESS TEST TRACING ONLY: CPT

## 2023-02-02 ENCOUNTER — HOSPITAL ENCOUNTER (OUTPATIENT)
Dept: SLEEP CENTER | Age: 63
Discharge: HOME | End: 2023-02-02
Payer: COMMERCIAL

## 2023-02-02 DIAGNOSIS — G47.33 OSA (OBSTRUCTIVE SLEEP APNEA): ICD-10-CM

## 2023-02-02 PROCEDURE — 95806 SLEEP STUDY UNATT&RESP EFFT: CPT

## 2023-02-06 PROBLEM — G47.33 OSA (OBSTRUCTIVE SLEEP APNEA): Status: ACTIVE | Noted: 2023-02-06

## 2023-02-08 ENCOUNTER — TELEPHONE (OUTPATIENT)
Dept: PULMONOLOGY | Age: 63
End: 2023-02-08

## 2023-02-08 NOTE — TELEPHONE ENCOUNTER
Home Sleep study showed severe HARSHAD. AHI was 32.5  per hr. And O2 Desaturations to 76%. Dr Harjinder Toribio: Follow up with the patient's sleep physician to discuss results  A trial of CPAP titration is recommended with supplemental oxygen per protocol if needed. Spoke with patient. Gave Home Sleep results, and transferred patient to [facility] for a Titration Study. Pt informed that office will call back when results are in and then we will sent in order with his DME choice. Pt also informed to call back to our office to schedule his 31-90 day follow up after his machine is in use.

## 2023-02-27 ENCOUNTER — HOSPITAL ENCOUNTER (OUTPATIENT)
Dept: SLEEP CENTER | Age: 63
Discharge: HOME | End: 2023-02-27
Payer: COMMERCIAL

## 2023-02-27 DIAGNOSIS — G47.33 OSA (OBSTRUCTIVE SLEEP APNEA): ICD-10-CM

## 2023-02-27 PROCEDURE — 95811 POLYSOM 6/>YRS CPAP 4/> PARM: CPT

## 2023-02-28 PROBLEM — G47.39 TREATMENT-EMERGENT CENTRAL SLEEP APNEA: Status: ACTIVE | Noted: 2023-02-28

## 2023-02-28 NOTE — PROGRESS NOTES
Rani Kingston         : 1960  [] [address]     [] [address]      [] [facility]     [x][facility]    [] [facility]  [] [facility]  [] [facility]   [] [facility] [] Other:  Diagnosis: [x] HARSHAD (G47.33) [] CSA (G47.31) [] Apnea (G47.30)   Length of Need: [] 12 Months [x] 99 Months [] Other:    Machine (SABINA!):  [x] ResMed AirSense     Auto [] Other:     []  CPAP () [x] Bilevel ()   Mode: [] Auto [] Spontaneous    Mode: [] Auto [] Spontaneous                     13/8 cm        Comfort Settings:   - Ramp Pressure: 5 cmH2O                                        - Ramp time: 15 min                                     -  Flex/EPR - 3 full time                                    - For ResMed Bilevel (TiMax-4 sec   TiMin- 0.2 sec)     Humidifier: [x] Heated ()        [x] Water chamber replacement ()/ 1 per 6 months        Mask:  Please always start with the mask the patient used during the titraion   [x] Nasal () /1 per 3 months [x] Full Face () /1 per 3 months   [x] Patient choice -Size and fit mask [x] Patient Choice - Size and fit mask   [] Dispense:   medium Airfit N20  [] Dispense:    [x] Headgear () / 1 per 3 months [x] Headgear () / 1 per 3 months   [x] Replacement Nasal Cushion ()/2 per month [x] Interface Replacement ()/1 per month   [x] Replacement Nasal Pillows ()/2 per month         Tubing: [x] Heated ()/1 per 3 months    [] Standard ()/1 per 3 months [] Other:           Filters: [x] Non-disposable ()/1 per 6 months     [x] Ultra-Fine, Disposable ()/2 per month        Miscellaneous: [x] Chin Strap ()/ 1 per 6 months [] O2 bleed-in:       LPM   [] Oximetry on CPAP/Bilevel []  Other:          Start Order Date: 23    MEDICAL JUSTIFICATION:  I, the undersigned, certify that the above prescribed supplies are medically necessary for this patients wellbeing.   In my opinion, the supplies are both reasonable and necessary in reference to accepted standards of medicalpractice in treatment of this patients condition.     Gilbert Schmidt MD      NPI: [id number]       Order Signed Date: 02/28/23    Electronically signed by Gilbert Schmidt MD on 2/28/2023 at 3:01 PM

## 2023-03-01 ENCOUNTER — TELEPHONE (OUTPATIENT)
Dept: PULMONOLOGY | Age: 63
End: 2023-03-01

## 2023-03-01 PROCEDURE — 95811 POLYSOM 6/>YRS CPAP 4/> PARM: CPT | Performed by: PSYCHIATRY & NEUROLOGY

## 2023-03-01 NOTE — TELEPHONE ENCOUNTER
Titration study shows adequate control of the events at a BiPAP pressure of 13/8 cm.      DME: [facility] - expedited     Patient will need appointment 31-90 days after starting new machine    The patient has been notified of this information and all questions answered.

## 2023-03-02 ENCOUNTER — TELEPHONE (OUTPATIENT)
Dept: PULMONOLOGY | Age: 63
End: 2023-03-02

## 2023-03-02 NOTE — TELEPHONE ENCOUNTER
Phone call to patient advising him to call [facility] and make sure they take his insurance before we send order again. He expressed understanding and will let us know.

## 2023-03-02 NOTE — TELEPHONE ENCOUNTER
Rain Allison says [facility] told him they don't have CPAP machines. He would like his order sent to [facility] instead. Please send his order to [facility]. Thank you!

## 2023-03-04 ENCOUNTER — HOSPITAL ENCOUNTER (EMERGENCY)
Age: 63
Discharge: HOME | End: 2023-03-04
Attending: EMERGENCY MEDICINE
Payer: COMMERCIAL

## 2023-03-04 VITALS
BODY MASS INDEX: 38.63 KG/M2 | HEIGHT: 70 IN | OXYGEN SATURATION: 96 % | RESPIRATION RATE: 16 BRPM | DIASTOLIC BLOOD PRESSURE: 79 MMHG | SYSTOLIC BLOOD PRESSURE: 123 MMHG | TEMPERATURE: 97.1 F | HEART RATE: 93 BPM | WEIGHT: 269.84 LBS

## 2023-03-04 DIAGNOSIS — R04.0 EPISTAXIS: Primary | ICD-10-CM

## 2023-03-04 PROCEDURE — 99282 EMERGENCY DEPT VISIT SF MDM: CPT

## 2023-03-04 ASSESSMENT — PAIN - FUNCTIONAL ASSESSMENT
PAIN_FUNCTIONAL_ASSESSMENT: NONE - DENIES PAIN
PAIN_FUNCTIONAL_ASSESSMENT: 0-10

## 2023-03-04 ASSESSMENT — PAIN SCALES - GENERAL: PAINLEVEL_OUTOF10: 0

## 2023-03-05 NOTE — ED NOTES
Pt sitting in side chair in room. No further nasal bleeding noted/ reported. Discharge instructions given. Pt discharged ambulatory with family.      Yvonne Lawson RN  03/04/23 2950

## 2023-03-05 NOTE — ED PROVIDER NOTES
CHIEF COMPLAINT  Chief Complaint   Patient presents with    Epistaxis     Left side nose bleed x 30 minutes. Nose bleed stopped while patient was in the waiting room. He also had nose bleed yesterday. Denies any blood thinners        HISTORY OF PRESENT ILLNESS  Devin Reardon is a 59 y.o. male who presents to the ED complaining of a nosebleed that started today and lasted approximately 30 minutes and when it did not stop when he shoved toilet paper into the nose he came to the emergency room. Patient has similar episode that lasted a short amount of time yesterday after he was picking his nose. Patient is on no anticoagulation or antiplatelet agents. No other bleeding manifestations. No headache. Patient's bleeding stopped spontaneously when he arrived to the emergency room and has not recurred. No other complaints, modifying factors or associated symptoms. Nursing notes reviewed.    Past Medical History:   Diagnosis Date    GERD (gastroesophageal reflux disease)     Hyperlipidemia      Past Surgical History:   Procedure Laterality Date    COLONOSCOPY  11/28/2012    normal dr Julianne Solis recheck 10 years    COLONOSCOPY  08/25/2017    normal dr Seda Mcqueen Bilateral 03/2018    relieve pressure in eyes    FOOT SURGERY      left    KNEE CARTILAGE SURGERY      left    VARICOSE VEIN SURGERY Bilateral 11/2017     Family History   Problem Relation Age of Onset    Cancer Father 79        prostate/throat/lung    High Cholesterol Mother     Heart Attack Mother         mild    COPD Mother     No Known Problems Sister     Diabetes Brother     No Known Problems Maternal Grandmother     Heart Disease Maternal Grandfather     Heart Attack Paternal Grandmother     No Known Problems Paternal Grandfather     No Known Problems Sister     No Known Problems Sister     No Known Problems Brother     No Known Problems Brother     No Known Problems Brother     No Known Problems Brother     No Known Problems Brother     No Known Problems Brother      Social History     Socioeconomic History    Marital status:      Spouse name: Not on file    Number of children: Not on file    Years of education: Not on file    Highest education level: Not on file   Occupational History    Not on file   Tobacco Use    Smoking status: Former     Packs/day: 0.50     Years: 15.00     Pack years: 7.50     Types: Cigarettes     Start date: 7/10/1977     Quit date: 7/10/1992     Years since quittin.6     Passive exposure: Past    Smokeless tobacco: Never   Vaping Use    Vaping Use: Never used   Substance and Sexual Activity    Alcohol use: Not Currently     Comment: occasional    Drug use: No     Comment: quit 20 yrs ago    Sexual activity: Yes     Partners: Female   Other Topics Concern    Not on file   Social History Narrative    Not on file     Social Determinants of Health     Financial Resource Strain: Low Risk     Difficulty of Paying Living Expenses: Not hard at all   Food Insecurity: No Food Insecurity    Worried About Running Out of Food in the Last Year: Never true    [address] in the Last Year: Never true   Transportation Needs: No Transportation Needs    Lack of Transportation (Medical): No    Lack of Transportation (Non-Medical): No   Physical Activity: Not on file   Stress: Not on file   Social Connections: Not on file   Intimate Partner Violence: Not on file   Housing Stability: Not on file     No current facility-administered medications for this encounter. No current outpatient medications on file. Allergies   Allergen Reactions    Sulfa Antibiotics Hives       REVIEW OF SYSTEMS  Positives and pertinent negatives as per HPI. Six other systems were reviewed and are negative. Nursing notes pertaining to ROS were reviewed.           PHYSICAL EXAM   /79   Pulse 93   Temp 97.1 °F (36.2 °C) (Tympanic)   Resp 16   Ht 5' 10\" (1.778 m)   Wt 269 lb 13.5 oz (122.4 kg)   SpO2 96%   BMI 38.72 kg/m²   GENERAL APPEARANCE: Awake and alert. Cooperative. No acute distress. HEAD: Normocephalic. Atraumatic. EYES: PERRL. EOM's grossly intact. No scleral icterus, injection or exudate  ENT: Mucous membranes are moist.  Nasal mucous membranes revealed bilateral septal hemostatic cracked and dried mucosa that is not actively bleeding. No polyps or lesions. No oropharyngeal active bleeding. NECK: Supple. Normal ROM. No lymphadenopathy    EXTREMITIES: MAEE. No acute deformities. No rash, petechiae or ecchymoses  SKIN: Warm and dry. NEUROLOGICAL: Alert and oriented. ED COURSE/MDM  Anterior epistaxis with spontaneous resolution with no other evidence of bleeding manifestations in the patient who reported that symptoms started both times after picking his nose. Patient was not cauterized due to the diffuse area of involvement that was all hemostatic. Patient was given counseling and a nose clamp on how to treat an anterior nosebleed in the future was advised to use Vaseline in the nose twice a day for the next week to moisturize the mucosa. Patient was given scripts for the following medications. I counseled patient how to take these medications. New Prescriptions    No medications on file         CLINICAL IMPRESSION  1. Epistaxis        Blood pressure 123/79, pulse 93, temperature 97.1 °F (36.2 °C), temperature source Tympanic, resp. rate 16, height 5' 10\" (1.778 m), weight 269 lb 13.5 oz (122.4 kg), SpO2 96 %.       Follow-up with:  Evelin Raygoza MD  [address]  [phone]    In 1 week  If symptoms worsen          Bernie Mendoza MD  03/04/23 [id number]

## 2023-04-07 ENCOUNTER — OFFICE VISIT (OUTPATIENT)
Dept: VASCULAR SURGERY | Age: 63
End: 2023-04-07
Payer: COMMERCIAL

## 2023-04-07 VITALS — WEIGHT: 269 LBS | HEIGHT: 70 IN | BODY MASS INDEX: 38.51 KG/M2

## 2023-04-07 DIAGNOSIS — I87.2 STASIS DERMATITIS OF BOTH LEGS: ICD-10-CM

## 2023-04-07 DIAGNOSIS — I83.893 VARICOSE VEINS OF BOTH LOWER EXTREMITIES WITH COMPLICATIONS: Primary | ICD-10-CM

## 2023-04-07 PROCEDURE — 99213 OFFICE O/P EST LOW 20 MIN: CPT | Performed by: SURGERY

## 2023-04-07 NOTE — PROGRESS NOTES
Subjective:      Patient ID: Rafat Tony is a 59 y.o. male. HPI Previously seen last year for evaluation of right ankle area skin discoloration and inflammation. S/P B GSV laser ablation 2017 by A Avery for the same issue. Previous w/u included reflux scan that demonstrated short segment thigh GSV reflux + B  reflux as well as deep reflux. Decided to manage with stockings which he now is not wearing. Leg pain improved with management of ortho issues. Now back for L lateral leg discomfort while in [place]. Resolved. No history of SVT, audie ulceration but has had some swelling and noticed bleeding from his right leg - last episode over a year ago. Past Medical History:   Diagnosis Date    GERD (gastroesophageal reflux disease)     Hyperlipidemia      Past Surgical History:   Procedure Laterality Date    COLONOSCOPY  2012    normal dr Farnck Garay recheck 10 years    COLONOSCOPY  2017    normal dr Kahlil Brennan Bilateral 2018    relieve pressure in eyes    FOOT SURGERY      left    KNEE CARTILAGE SURGERY      left    VARICOSE VEIN SURGERY Bilateral 2017     Allergies   Allergen Reactions    Sulfa Antibiotics Hives     No current outpatient medications on file. No current facility-administered medications for this visit. Social History     Socioeconomic History    Marital status:       Spouse name: Not on file    Number of children: Not on file    Years of education: Not on file    Highest education level: Not on file   Occupational History    Not on file   Tobacco Use    Smoking status: Former     Packs/day: 0.50     Years: 15.00     Pack years: 7.50     Types: Cigarettes     Start date: 7/10/1977     Quit date: 7/10/1992     Years since quittin.7     Passive exposure: Past    Smokeless tobacco: Never   Vaping Use    Vaping Use: Never used   Substance and Sexual Activity    Alcohol use: Not Currently     Comment: occasional    Drug use: No     Comment:

## 2023-04-07 NOTE — Clinical Note
Dorothea Reyes saw Cally Patel again in the office today about a year since he was last seen for his varicose veins and stasis changes. He has not been compliant with stocking use but has had no significant changes either in his leg appearance nor is he had complications such as bleeding, superficial clots or ulcerations. At this time we we will resume compression stockings and I have stressed compliance with this gentleman. Should he develop any other further problems I will see him back in the office for reevaluation. If you have any questions please feel free to contact me. Thanks for asked me to see him originally and please let me know if I can help with any other patients in the future.   Fritz Gilmore

## 2023-05-11 ENCOUNTER — OFFICE VISIT (OUTPATIENT)
Dept: FAMILY MEDICINE CLINIC | Age: 63
End: 2023-05-11
Payer: COMMERCIAL

## 2023-05-11 VITALS
DIASTOLIC BLOOD PRESSURE: 80 MMHG | SYSTOLIC BLOOD PRESSURE: 128 MMHG | HEIGHT: 70 IN | BODY MASS INDEX: 38.65 KG/M2 | WEIGHT: 270 LBS | TEMPERATURE: 98.3 F | HEART RATE: 76 BPM

## 2023-05-11 DIAGNOSIS — Z12.5 PROSTATE CANCER SCREENING: ICD-10-CM

## 2023-05-11 DIAGNOSIS — E55.9 VITAMIN D DEFICIENCY: ICD-10-CM

## 2023-05-11 DIAGNOSIS — E78.00 HYPERCHOLESTEROLEMIA: Primary | ICD-10-CM

## 2023-05-11 PROCEDURE — 99214 OFFICE O/P EST MOD 30 MIN: CPT | Performed by: FAMILY MEDICINE

## 2023-05-11 SDOH — ECONOMIC STABILITY: HOUSING INSECURITY
IN THE LAST 12 MONTHS, WAS THERE A TIME WHEN YOU DID NOT HAVE A STEADY PLACE TO SLEEP OR SLEPT IN A SHELTER (INCLUDING NOW)?: NO

## 2023-05-11 SDOH — ECONOMIC STABILITY: FOOD INSECURITY: WITHIN THE PAST 12 MONTHS, YOU WORRIED THAT YOUR FOOD WOULD RUN OUT BEFORE YOU GOT MONEY TO BUY MORE.: NEVER TRUE

## 2023-05-11 SDOH — ECONOMIC STABILITY: INCOME INSECURITY: HOW HARD IS IT FOR YOU TO PAY FOR THE VERY BASICS LIKE FOOD, HOUSING, MEDICAL CARE, AND HEATING?: NOT HARD AT ALL

## 2023-05-11 SDOH — ECONOMIC STABILITY: FOOD INSECURITY: WITHIN THE PAST 12 MONTHS, THE FOOD YOU BOUGHT JUST DIDN'T LAST AND YOU DIDN'T HAVE MONEY TO GET MORE.: NEVER TRUE

## 2023-05-11 ASSESSMENT — PATIENT HEALTH QUESTIONNAIRE - PHQ9
SUM OF ALL RESPONSES TO PHQ QUESTIONS 1-9: 0
SUM OF ALL RESPONSES TO PHQ QUESTIONS 1-9: 0
SUM OF ALL RESPONSES TO PHQ9 QUESTIONS 1 & 2: 0
SUM OF ALL RESPONSES TO PHQ QUESTIONS 1-9: 0
SUM OF ALL RESPONSES TO PHQ QUESTIONS 1-9: 0
1. LITTLE INTEREST OR PLEASURE IN DOING THINGS: 0
2. FEELING DOWN, DEPRESSED OR HOPELESS: 0

## 2023-05-11 ASSESSMENT — ENCOUNTER SYMPTOMS
CHEST TIGHTNESS: 0
NAUSEA: 0
ABDOMINAL DISTENTION: 0
VOMITING: 0
ROS SKIN COMMENTS: NO SKIN LESIONS
BLOOD IN STOOL: 0
ABDOMINAL PAIN: 0
CONSTIPATION: 0
DIARRHEA: 0
SHORTNESS OF BREATH: 0

## 2023-05-11 NOTE — PROGRESS NOTES
Subjective:      Patient ID: Zonia Bridges is a 59 y.o. male. Chief Complaint   Patient presents with    Annual Exam        Patient presents with: Annual Exam      He is here for the lipid a and labs   He tells me he had a wellness check already this year    He tells me he was low on vit d in the past     YOB: 1960    Date of Visit:  5/11/2023     -- Sulfa Antibiotics -- Hives    Current Outpatient Medications:  Tetrahydrozoline-PEG 0.05-1 % SOLN, Apply to eye, Disp: , Rfl:     No current facility-administered medications for this visit.      ---------------------------               05/11/23                      1502         ---------------------------   BP:          128/80         Site:    Left Upper Arm     Position:     Sitting        Cuff Size:   Large Adult      Pulse:         76           Temp:   98.3 °F (36.8 °C)   TempSrc:    Temporal        Weight: 270 lb (122.5 kg)   Height: 5' 10\" (1.778 m)   ---------------------------  Body mass index is 38.74 kg/m². Wt Readings from Last 3 Encounters:  05/11/23 : 270 lb (122.5 kg)  04/07/23 : 269 lb (122 kg)  03/04/23 : 269 lb 13.5 oz (122.4 kg)    BP Readings from Last 3 Encounters:  05/11/23 : 128/80  03/04/23 : 123/79  01/23/23 : 100/70          Review of Systems   Constitutional:  Negative for appetite change, chills, fever and unexpected weight change. Respiratory:  Negative for chest tightness and shortness of breath. Cardiovascular:  Negative for chest pain, palpitations and leg swelling. Gastrointestinal:  Negative for abdominal distention, abdominal pain, blood in stool, constipation, diarrhea, nausea and vomiting. No gerd no dysphagia    Genitourinary:  Negative for difficulty urinating, dysuria and hematuria. Skin:  Negative for rash. No skin lesions    Neurological:  Negative for dizziness, syncope and headaches.      Objective:   Physical Exam  Constitutional:

## 2023-05-31 ENCOUNTER — OFFICE VISIT (OUTPATIENT)
Dept: FAMILY MEDICINE CLINIC | Age: 63
End: 2023-05-31
Payer: COMMERCIAL

## 2023-05-31 VITALS
BODY MASS INDEX: 38.42 KG/M2 | WEIGHT: 268.4 LBS | SYSTOLIC BLOOD PRESSURE: 118 MMHG | DIASTOLIC BLOOD PRESSURE: 70 MMHG | TEMPERATURE: 97.9 F | HEIGHT: 70 IN

## 2023-05-31 DIAGNOSIS — B96.89 ACUTE BACTERIAL SINUSITIS: Primary | ICD-10-CM

## 2023-05-31 DIAGNOSIS — J01.90 ACUTE BACTERIAL SINUSITIS: Primary | ICD-10-CM

## 2023-05-31 PROCEDURE — 99213 OFFICE O/P EST LOW 20 MIN: CPT | Performed by: INTERNAL MEDICINE

## 2023-05-31 RX ORDER — CEFUROXIME AXETIL 250 MG/1
250 TABLET ORAL 2 TIMES DAILY
Qty: 20 TABLET | Refills: 0 | Status: SHIPPED | OUTPATIENT
Start: 2023-05-31 | End: 2023-06-10

## 2023-05-31 ASSESSMENT — ENCOUNTER SYMPTOMS
WHEEZING: 0
ABDOMINAL PAIN: 0
SHORTNESS OF BREATH: 0
COUGH: 1

## 2023-05-31 NOTE — PROGRESS NOTES
lorin Wing (:  1960) is a 59 y.o. male,Established patient, here for evaluation of the following chief complaint(s):  Congestion (Sore throat, mucous is reddish, cough)  Johanny Wing is a 59 y.o. male with the following history as recorded in EpicCare:  Patient Active Problem List    Diagnosis Date Noted    Treatment-emergent central sleep apnea 2023    HARSHAD (obstructive sleep apnea) 2023    Chronic bilateral low back pain 06/15/2018    Chronic venous insufficiency 2017    Varicose veins of both lower extremities with complications     Hypercholesterolemia 2015    Left knee DJD 2013    Bronchitis 2013    Cough 2013    Obesity (BMI 30-39.9) 10/25/2012    Asymptomatic varicose veins 10/22/2010    Abnormal weight gain 2010    Elevated blood pressure reading without diagnosis of hypertension 2010     Current Outpatient Medications   Medication Sig Dispense Refill    Tetrahydrozoline-PEG 0.05-1 % SOLN Apply to eye       No current facility-administered medications for this visit. Allergies: Sulfa antibiotics  Past Medical History:   Diagnosis Date    GERD (gastroesophageal reflux disease)     Hyperlipidemia      Past Surgical History:   Procedure Laterality Date    COLONOSCOPY  2012    normal dr Lisbeth murphy 10 years    COLONOSCOPY  2017    normal dr Corrinne Door.  repeat 10 years    EYE SURGERY Bilateral 2018    relieve pressure in eyes    FOOT SURGERY      left    KNEE CARTILAGE SURGERY      left    VARICOSE VEIN SURGERY Bilateral 2017     Family History   Problem Relation Age of Onset    Cancer Father 79        prostate/throat/lung    High Cholesterol Mother     Heart Attack Mother         mild    COPD Mother     No Known Problems Sister     Diabetes Brother     No Known Problems Maternal Grandmother     Heart Disease Maternal Grandfather     Heart Attack Paternal Grandmother     No Known Problems Paternal Grandfather

## 2023-06-09 DIAGNOSIS — E55.9 VITAMIN D DEFICIENCY: ICD-10-CM

## 2023-06-09 DIAGNOSIS — E78.00 HYPERCHOLESTEROLEMIA: ICD-10-CM

## 2023-06-09 DIAGNOSIS — Z12.5 PROSTATE CANCER SCREENING: ICD-10-CM

## 2023-06-09 LAB
25(OH)D3 SERPL-MCNC: 20.3 NG/ML
BACTERIA URNS QL MICRO: NORMAL /HPF
BILIRUB UR QL STRIP.AUTO: NEGATIVE
CHOLEST SERPL-MCNC: 177 MG/DL (ref 0–199)
CLARITY UR: CLEAR
COLOR UR: YELLOW
EPI CELLS #/AREA URNS AUTO: 0 /HPF (ref 0–5)
GLUCOSE UR STRIP.AUTO-MCNC: NEGATIVE MG/DL
HDLC SERPL-MCNC: 45 MG/DL (ref 40–60)
HGB UR QL STRIP.AUTO: NEGATIVE
HYALINE CASTS #/AREA URNS AUTO: 0 /LPF (ref 0–8)
KETONES UR STRIP.AUTO-MCNC: NEGATIVE MG/DL
LDLC SERPL CALC-MCNC: 120 MG/DL
LEUKOCYTE ESTERASE UR QL STRIP.AUTO: NEGATIVE
NITRITE UR QL STRIP.AUTO: NEGATIVE
PH UR STRIP.AUTO: 6.5 [PH] (ref 5–8)
PROT UR STRIP.AUTO-MCNC: NEGATIVE MG/DL
PSA SERPL DL<=0.01 NG/ML-MCNC: 0.38 NG/ML (ref 0–4)
RBC CLUMPS #/AREA URNS AUTO: 0 /HPF (ref 0–4)
SP GR UR STRIP.AUTO: 1.02 (ref 1–1.03)
TRIGL SERPL-MCNC: 60 MG/DL (ref 0–150)
UA DIPSTICK W REFLEX MICRO PNL UR: NORMAL
URN SPEC COLLECT METH UR: NORMAL
UROBILINOGEN UR STRIP-ACNC: 0.2 E.U./DL
VLDLC SERPL CALC-MCNC: 12 MG/DL
WBC #/AREA URNS AUTO: 0 /HPF (ref 0–5)

## 2023-07-16 ENCOUNTER — HOSPITAL ENCOUNTER (EMERGENCY)
Age: 63
Discharge: LEFT BEFORE DISCHARGE | End: 2023-07-16

## 2023-07-20 ENCOUNTER — TELEPHONE (OUTPATIENT)
Dept: FAMILY MEDICINE CLINIC | Age: 63
End: 2023-07-20

## 2023-07-20 NOTE — TELEPHONE ENCOUNTER
----- Message from Radha Ramos sent at 7/20/2023  8:09 AM EDT -----  Subject: Appointment Request    Reason for Call: Established Patient Appointment needed: Routine ED Follow   Up Visit    QUESTIONS    Reason for appointment request? No appointments available during search     Additional Information for Provider? Patient called in to schedule his   follow up appt after being seen in the ED.  Patient was seen because his   left side was hurting along with his chest. Patient did have an MRI done   at the ED.  ---------------------------------------------------------------------------  --------------  [address]  [phone]; OK to leave message on voicemail  ---------------------------------------------------------------------------  --------------  SCRIPT ANSWERS

## 2023-07-21 ENCOUNTER — HOSPITAL ENCOUNTER (OUTPATIENT)
Dept: GENERAL RADIOLOGY | Age: 63
Discharge: HOME | End: 2023-07-21
Payer: COMMERCIAL

## 2023-07-21 ENCOUNTER — HOSPITAL ENCOUNTER (OUTPATIENT)
Age: 63
Discharge: HOME | End: 2023-07-21
Payer: COMMERCIAL

## 2023-07-21 ENCOUNTER — OFFICE VISIT (OUTPATIENT)
Dept: FAMILY MEDICINE CLINIC | Age: 63
End: 2023-07-21
Payer: COMMERCIAL

## 2023-07-21 VITALS
BODY MASS INDEX: 36.65 KG/M2 | WEIGHT: 256 LBS | SYSTOLIC BLOOD PRESSURE: 124 MMHG | TEMPERATURE: 98.1 F | DIASTOLIC BLOOD PRESSURE: 82 MMHG | HEIGHT: 70 IN

## 2023-07-21 DIAGNOSIS — M54.50 ACUTE MIDLINE LOW BACK PAIN WITHOUT SCIATICA: ICD-10-CM

## 2023-07-21 DIAGNOSIS — J40 BRONCHITIS: Primary | ICD-10-CM

## 2023-07-21 PROCEDURE — 99213 OFFICE O/P EST LOW 20 MIN: CPT | Performed by: FAMILY MEDICINE

## 2023-07-21 PROCEDURE — 72100 X-RAY EXAM L-S SPINE 2/3 VWS: CPT

## 2023-07-21 RX ORDER — IBUPROFEN 600 MG/1
600 TABLET ORAL EVERY 8 HOURS PRN
COMMUNITY
Start: 2023-07-16

## 2023-07-21 RX ORDER — CYCLOBENZAPRINE HCL 10 MG
10 TABLET ORAL EVERY 8 HOURS PRN
COMMUNITY
Start: 2023-07-16

## 2023-07-21 RX ORDER — AMOXICILLIN AND CLAVULANATE POTASSIUM 875; 125 MG/1; MG/1
1 TABLET, FILM COATED ORAL 2 TIMES DAILY
Qty: 20 TABLET | Refills: 0 | Status: SHIPPED | OUTPATIENT
Start: 2023-07-21 | End: 2023-07-31

## 2023-07-21 NOTE — PATIENT INSTRUCTIONS
Take the antibiotic  Call if not better  Return to see if no improvement   Use advil over the counter for the next week  If the back symptoms do not resolve call me   repeat a CT of the chest next year

## 2023-07-28 ENCOUNTER — TELEPHONE (OUTPATIENT)
Dept: FAMILY MEDICINE CLINIC | Age: 63
End: 2023-07-28

## 2023-07-28 RX ORDER — METHYLPREDNISOLONE 4 MG/1
TABLET ORAL
Qty: 1 KIT | Refills: 0 | Status: SHIPPED | OUTPATIENT
Start: 2023-07-28 | End: 2023-08-03

## 2023-07-28 NOTE — TELEPHONE ENCOUNTER
Gerda Keep advised and verbalized understanding.
Gracia Atkinson called back in stating he is requesting steroids to be sent to [facility]. He did not take one this morning, his last pill was last evening. Please advise.
Patient has been talking antibiotic for sinus infection but now he has a rash on back and arms. He did not have the rash before taking. Please advise    Leaving town at [address] today. Felicia Segura
Victor Manuel Grave if worse go to urgent care  Also use benadryl for itch if needed    Orders Placed This Encounter   Medications    methylPREDNISolone (MEDROL DOSEPACK) 4 MG tablet     Sig: Take by mouth.      Dispense:  1 kit     Refill:  0
(4) no impairment

## 2023-10-19 ENCOUNTER — TELEPHONE (OUTPATIENT)
Dept: FAMILY MEDICINE CLINIC | Age: 63
End: 2023-10-19

## 2023-10-19 NOTE — TELEPHONE ENCOUNTER
----- Message from Anant Lidia sent at 10/19/2023 11:01 AM EDT -----  Subject: Message to Provider    QUESTIONS  Information for Provider? Patient would like to know if his AWV form was   sent over to his insurance as it needs sent in by 10/31. Please call   patient and let him know if the form has been sent in.  ---------------------------------------------------------------------------  --------------  Jose Guadalupe [place]  [phone]; OK to leave message on voicemail,OK to respond with electronic   message via [employer] portal (only for patients who have registered [employer]   account)  ---------------------------------------------------------------------------  --------------  SCRIPT ANSWERS  Relationship to Patient?  Self

## 2023-10-19 NOTE — TELEPHONE ENCOUNTER
Mendoza Weir that we do not have a form. He states he couldn't remember if he left one or not. He will drop or fax the form.

## 2023-10-20 NOTE — PATIENT INSTRUCTIONS
Continue the low fat diet  Do the fasting blood and urine test on or after Aby the 3rd  Consider the shingle vaccine   See me back in about a year    Regarding the last colon check dr Sweetie Stringer noted the following: The patient tolerated the procedure well and was taken to the [address] [place] [address] Unit in good condition. Impression: Normal screening colonoscopy. No source of blood loss was identified. There are no genetic risk factors. Recommendations: Repeat colonoscopy screening in ten years.     Electronically signed by Pavel Valdez MD on 8/25/2017 at 10:22 AM. The patient is Stable - Low risk of patient condition declining or worsening    Shift Goals  Clinical Goals: Monitor skin integrity, Q2 turns,  Patient Goals: Rest  Family Goals: MARIVEL    Progress made toward(s) clinical / shift goals:      Problem: Fluid Volume  Goal: Fluid volume balance will be maintained  Outcome: Progressing     Problem: Mobility  Goal: Patient's capacity to carry out activities will improve  Outcome: Progressing     Problem: Skin Integrity  Goal: Skin integrity is maintained or improved  Outcome: Progressing  Note: Patient Is Q2 turns. He is on a low air loss mattress.      Problem: Pain - Standard  Goal: Alleviation of pain or a reduction in pain to the patient’s comfort goal  Outcome: Progressing

## 2023-10-30 ENCOUNTER — OFFICE VISIT (OUTPATIENT)
Dept: FAMILY MEDICINE CLINIC | Age: 63
End: 2023-10-30
Payer: COMMERCIAL

## 2023-10-30 VITALS
SYSTOLIC BLOOD PRESSURE: 128 MMHG | TEMPERATURE: 97.7 F | WEIGHT: 263 LBS | DIASTOLIC BLOOD PRESSURE: 70 MMHG | BODY MASS INDEX: 37.65 KG/M2 | HEIGHT: 70 IN

## 2023-10-30 DIAGNOSIS — M54.50 LEFT-SIDED LOW BACK PAIN WITHOUT SCIATICA, UNSPECIFIED CHRONICITY: Primary | ICD-10-CM

## 2023-10-30 DIAGNOSIS — Z23 NEEDS FLU SHOT: ICD-10-CM

## 2023-10-30 DIAGNOSIS — M54.50 LEFT-SIDED LOW BACK PAIN WITHOUT SCIATICA, UNSPECIFIED CHRONICITY: ICD-10-CM

## 2023-10-30 LAB
ALBUMIN SERPL-MCNC: 4.5 G/DL (ref 3.4–5)
ALBUMIN/GLOB SERPL: 1.9 {RATIO} (ref 1.1–2.2)
ALP SERPL-CCNC: 99 U/L (ref 40–129)
ALT SERPL-CCNC: 37 U/L (ref 10–40)
ANION GAP SERPL CALCULATED.3IONS-SCNC: 11 MMOL/L (ref 3–16)
AST SERPL-CCNC: 17 U/L (ref 15–37)
BACTERIA URNS QL MICRO: NORMAL /HPF
BASOPHILS # BLD: 0 K/UL (ref 0–0.2)
BASOPHILS NFR BLD: 0.4 %
BILIRUB SERPL-MCNC: 0.4 MG/DL (ref 0–1)
BILIRUB UR QL STRIP.AUTO: NEGATIVE
BUN SERPL-MCNC: 11 MG/DL (ref 7–20)
CALCIUM SERPL-MCNC: 9.1 MG/DL (ref 8.3–10.6)
CHLORIDE SERPL-SCNC: 104 MMOL/L (ref 99–110)
CLARITY UR: CLEAR
CO2 SERPL-SCNC: 26 MMOL/L (ref 21–32)
COLOR UR: YELLOW
CREAT SERPL-MCNC: 0.7 MG/DL (ref 0.8–1.3)
DEPRECATED RDW RBC AUTO: 13.3 % (ref 12.4–15.4)
EOSINOPHIL # BLD: 0.1 K/UL (ref 0–0.6)
EOSINOPHIL NFR BLD: 1.6 %
EPI CELLS #/AREA URNS AUTO: 0 /HPF (ref 0–5)
GFR SERPLBLD CREATININE-BSD FMLA CKD-EPI: >60 ML/MIN/{1.73_M2}
GLUCOSE SERPL-MCNC: 78 MG/DL (ref 70–99)
GLUCOSE UR STRIP.AUTO-MCNC: NEGATIVE MG/DL
HCT VFR BLD AUTO: 48.7 % (ref 40.5–52.5)
HGB BLD-MCNC: 16.4 G/DL (ref 13.5–17.5)
HGB UR QL STRIP.AUTO: NEGATIVE
HYALINE CASTS #/AREA URNS AUTO: 0 /LPF (ref 0–8)
KETONES UR STRIP.AUTO-MCNC: NEGATIVE MG/DL
LEUKOCYTE ESTERASE UR QL STRIP.AUTO: NEGATIVE
LYMPHOCYTES # BLD: 2.1 K/UL (ref 1–5.1)
LYMPHOCYTES NFR BLD: 28.8 %
MCH RBC QN AUTO: 30.7 PG (ref 26–34)
MCHC RBC AUTO-ENTMCNC: 33.7 G/DL (ref 31–36)
MCV RBC AUTO: 90.9 FL (ref 80–100)
MONOCYTES # BLD: 0.6 K/UL (ref 0–1.3)
MONOCYTES NFR BLD: 7.7 %
NEUTROPHILS # BLD: 4.5 K/UL (ref 1.7–7.7)
NEUTROPHILS NFR BLD: 61.5 %
NITRITE UR QL STRIP.AUTO: NEGATIVE
PH UR STRIP.AUTO: 5.5 [PH] (ref 5–8)
PLATELET # BLD AUTO: 192 K/UL (ref 135–450)
PMV BLD AUTO: 9.4 FL (ref 5–10.5)
POTASSIUM SERPL-SCNC: 4.4 MMOL/L (ref 3.5–5.1)
PROT SERPL-MCNC: 6.9 G/DL (ref 6.4–8.2)
PROT UR STRIP.AUTO-MCNC: NEGATIVE MG/DL
RBC # BLD AUTO: 5.35 M/UL (ref 4.2–5.9)
RBC CLUMPS #/AREA URNS AUTO: 2 /HPF (ref 0–4)
SODIUM SERPL-SCNC: 141 MMOL/L (ref 136–145)
SP GR UR STRIP.AUTO: 1.02 (ref 1–1.03)
UA DIPSTICK W REFLEX MICRO PNL UR: NORMAL
URN SPEC COLLECT METH UR: NORMAL
UROBILINOGEN UR STRIP-ACNC: 0.2 E.U./DL
WBC # BLD AUTO: 7.3 K/UL (ref 4–11)
WBC #/AREA URNS AUTO: 0 /HPF (ref 0–5)

## 2023-10-30 PROCEDURE — 99213 OFFICE O/P EST LOW 20 MIN: CPT | Performed by: FAMILY MEDICINE

## 2023-10-30 PROCEDURE — 90674 CCIIV4 VAC NO PRSV 0.5 ML IM: CPT | Performed by: FAMILY MEDICINE

## 2023-10-30 PROCEDURE — 90471 IMMUNIZATION ADMIN: CPT | Performed by: FAMILY MEDICINE

## 2023-11-08 ENCOUNTER — OFFICE VISIT (OUTPATIENT)
Dept: ORTHOPEDIC SURGERY | Age: 63
End: 2023-11-08
Payer: COMMERCIAL

## 2023-11-08 VITALS — WEIGHT: 257.4 LBS | HEIGHT: 70 IN | RESPIRATION RATE: 17 BRPM | BODY MASS INDEX: 36.85 KG/M2

## 2023-11-08 DIAGNOSIS — M16.0 BILATERAL HIP JOINT ARTHRITIS: ICD-10-CM

## 2023-11-08 DIAGNOSIS — M47.816 LUMBAR FACET ARTHROPATHY: Primary | ICD-10-CM

## 2023-11-08 PROCEDURE — 99203 OFFICE O/P NEW LOW 30 MIN: CPT | Performed by: PHYSICIAN ASSISTANT

## 2023-11-08 RX ORDER — MELOXICAM 15 MG/1
15 TABLET ORAL DAILY
Qty: 30 TABLET | Refills: 1 | Status: SHIPPED | OUTPATIENT
Start: 2023-11-08

## 2023-11-09 NOTE — PROGRESS NOTES
related to lumbar degenerative disc disease as well as lumbar facet arthritis. He does remain neurologic intact in both lower extremities and there are no red flag warnings. He also has a right greater than left hip arthritis by exam and x-rays. He has a history of bilateral knee arthritis. I had an extensive discussion with Mr. Komal Abarca and/or family regarding the natural history, etiology, and long term consequences of his condition. I have presented reasonable alternatives to the patient's proposed care, treatment, and services. Risks and benefits of the treatment options also reviewed in detail. I have outlined a treatment plan with them. He has had full opportunity to ask his questions. I have answered them all to his satisfaction. I feel that Mr. Komal Abarca understands our discussion today. New Medications prescribed today:  Once he has completed the diclofenac he will begin Mobic 15 mg daily. PT:  Referred to PT      Follow up: 6 weeks. May x-ray both knees at that time if he wishes to have these evaluated. He was instructed to call us emergently if he begins to experience bowel or bladder dysfunction, saddle anesthesia, increasing muscle weakness, or onset/ worsening leg symptoms. The total time spent on today's visit including reviewing test results, history, performance of physical exam, counseling/ education, ordering of medications, tests or procedures was 35 minutes. This time does include completion of the medical record. This time excludes any time spent performing procedures or tests in the office. Isha Laura PA-C   Senior Physician Assistant   [facility]                                         Disclaimer: This note was generated with use of a verbal recognition program (DRAGON) and an attempt was made to check for errors.   It is possible that there are still dictated errors

## 2023-11-13 ENCOUNTER — HOSPITAL ENCOUNTER (OUTPATIENT)
Dept: PHYSICAL THERAPY | Age: 63
Setting detail: THERAPIES SERIES
Discharge: HOME | End: 2023-11-13

## 2023-11-13 DIAGNOSIS — Z78.9 DECREASED ACTIVITIES OF DAILY LIVING (ADL): ICD-10-CM

## 2023-11-13 DIAGNOSIS — M25.551 HIP PAIN, BILATERAL: ICD-10-CM

## 2023-11-13 DIAGNOSIS — G89.29 CHRONIC LOW BACK PAIN, UNSPECIFIED BACK PAIN LATERALITY, UNSPECIFIED WHETHER SCIATICA PRESENT: Primary | ICD-10-CM

## 2023-11-13 DIAGNOSIS — M54.50 CHRONIC LOW BACK PAIN, UNSPECIFIED BACK PAIN LATERALITY, UNSPECIFIED WHETHER SCIATICA PRESENT: Primary | ICD-10-CM

## 2023-11-13 DIAGNOSIS — M25.552 HIP PAIN, BILATERAL: ICD-10-CM

## 2023-11-13 NOTE — FLOWSHEET NOTE
[facility], [address] [place], [place] [id number]  Phone: [phone]   Fax:     [phone]     Physical Therapy  Cancellation/No-show Note  Patient Name:  Osman Salcedo  :  1960   Date:  2023  Cancelled visits to date: 0  No-shows to date: 0    Patient status for today's appointment patient:  []  Cancelled  []  Rescheduled appointment  []  No-show     Reason given by patient:  []  Patient ill  []  Conflicting appointment  []  No transportation    []  Conflict with work  []  No reason given  []  Other:     Comments:      Pt arrived for his initial PT evaluation but his estimation of benefits was not yet completed. Pt cancelled his initial evaluation. Plan: once EOB is complete, then perform initial PT evaluation.     Phone call information:   []  Phone call made today to patient at _ time at number provided:      []  Patient answered, conversation as follows:    []  Patient did not answer, message left as follows:  []  Phone call not made today    Electronically signed by:  Donnelly Councilman, PT

## 2023-11-30 ENCOUNTER — OFFICE VISIT (OUTPATIENT)
Dept: FAMILY MEDICINE CLINIC | Age: 63
End: 2023-11-30
Payer: COMMERCIAL

## 2023-11-30 VITALS
SYSTOLIC BLOOD PRESSURE: 114 MMHG | BODY MASS INDEX: 37.56 KG/M2 | OXYGEN SATURATION: 97 % | WEIGHT: 262.4 LBS | DIASTOLIC BLOOD PRESSURE: 74 MMHG | HEART RATE: 97 BPM | TEMPERATURE: 97.7 F | HEIGHT: 70 IN

## 2023-11-30 DIAGNOSIS — R05.8 COUGH PRODUCTIVE OF YELLOW SPUTUM: Primary | ICD-10-CM

## 2023-11-30 PROCEDURE — 99213 OFFICE O/P EST LOW 20 MIN: CPT | Performed by: NURSE PRACTITIONER

## 2023-11-30 RX ORDER — METHYLPREDNISOLONE 4 MG/1
TABLET ORAL
Qty: 1 KIT | Refills: 0 | Status: SHIPPED | OUTPATIENT
Start: 2023-11-30 | End: 2023-12-06

## 2023-11-30 RX ORDER — BENZONATATE 200 MG/1
200 CAPSULE ORAL 3 TIMES DAILY PRN
Qty: 30 CAPSULE | Refills: 0 | Status: SHIPPED | OUTPATIENT
Start: 2023-11-30 | End: 2023-12-07

## 2023-11-30 RX ORDER — DOXYCYCLINE HYCLATE 100 MG
100 TABLET ORAL 2 TIMES DAILY
Qty: 14 TABLET | Refills: 0 | Status: SHIPPED | OUTPATIENT
Start: 2023-11-30 | End: 2023-12-07

## 2023-11-30 ASSESSMENT — ENCOUNTER SYMPTOMS
COUGH: 1
ABDOMINAL PAIN: 0
RHINORRHEA: 1
SHORTNESS OF BREATH: 0
WHEEZING: 0
CHEST TIGHTNESS: 1

## 2023-11-30 NOTE — PROGRESS NOTES
Hollie Rose (:  1960) is a 59 y.o. male,Established patient, here for evaluation of the following chief complaint(s):  Cough (Nasal congestion and runny nose, productive colored cough since Thanksgiving. No fever. Pulled something in his back from coughing so hard. The morning is the worst and lays flat to sleep. )         ASSESSMENT/PLAN:  1. Cough productive of yellow sputum  New. Mederol dose pack for back pain from coughing. Take medications as prescribed. Follow pt education included. Return if symptoms worsen or fail to improve. Subjective   SUBJECTIVE/OBJECTIVE:  HPI  Chief Complaint   Patient presents with    Cough     Nasal congestion and runny nose, productive colored cough since Thanksgiving. No fever. Pulled something in his back from coughing so hard. The morning is the worst and lays flat to sleep. Little relief with OTC treatments. PO intake is good, exposed to sick grandchild before Thanksgi. Denies fever, abd pain, n/v.   Current Outpatient Medications   Medication Sig Dispense Refill    methylPREDNISolone (MEDROL DOSEPACK) 4 MG tablet Take by mouth. 1 kit 0    doxycycline hyclate (VIBRA-TABS) 100 MG tablet Take 1 tablet by mouth 2 times daily for 7 days 14 tablet 0    benzonatate (TESSALON) 200 MG capsule Take 1 capsule by mouth 3 times daily as needed for Cough 30 capsule 0    meloxicam (MOBIC) 15 MG tablet Take 1 tablet by mouth daily 30 tablet 1    vitamin D3 (CHOLECALCIFEROL) 25 MCG (1000 UT) TABS tablet Take 2 tablets by mouth daily 60 tablet 0     No current facility-administered medications for this visit. Past Medical History:   Diagnosis Date    GERD (gastroesophageal reflux disease)     Hyperlipidemia         Review of Systems   Constitutional:  Negative for fever. HENT:  Positive for congestion and rhinorrhea. Respiratory:  Positive for cough and chest tightness. Negative for shortness of breath and wheezing.     Gastrointestinal:  Negative for

## 2023-12-07 ENCOUNTER — OFFICE VISIT (OUTPATIENT)
Dept: ORTHOPEDIC SURGERY | Age: 63
End: 2023-12-07
Payer: COMMERCIAL

## 2023-12-07 VITALS — WEIGHT: 257 LBS | BODY MASS INDEX: 36.79 KG/M2 | HEIGHT: 70 IN

## 2023-12-07 DIAGNOSIS — M47.816 LUMBAR FACET ARTHROPATHY: ICD-10-CM

## 2023-12-07 DIAGNOSIS — M25.562 CHRONIC PAIN OF BOTH KNEES: Primary | ICD-10-CM

## 2023-12-07 DIAGNOSIS — M25.561 CHRONIC PAIN OF BOTH KNEES: Primary | ICD-10-CM

## 2023-12-07 DIAGNOSIS — M17.0 PRIMARY OSTEOARTHRITIS OF BOTH KNEES: ICD-10-CM

## 2023-12-07 DIAGNOSIS — G89.29 CHRONIC PAIN OF BOTH KNEES: Primary | ICD-10-CM

## 2023-12-07 PROCEDURE — 20610 DRAIN/INJ JOINT/BURSA W/O US: CPT | Performed by: PHYSICIAN ASSISTANT

## 2023-12-07 PROCEDURE — 99213 OFFICE O/P EST LOW 20 MIN: CPT | Performed by: PHYSICIAN ASSISTANT

## 2023-12-07 RX ORDER — BUPIVACAINE HYDROCHLORIDE 2.5 MG/ML
2 INJECTION, SOLUTION INFILTRATION; PERINEURAL ONCE
Status: COMPLETED | OUTPATIENT
Start: 2023-12-07 | End: 2023-12-07

## 2023-12-07 RX ORDER — TRIAMCINOLONE ACETONIDE 40 MG/ML
40 INJECTION, SUSPENSION INTRA-ARTICULAR; INTRAMUSCULAR ONCE
Status: COMPLETED | OUTPATIENT
Start: 2023-12-07 | End: 2023-12-07

## 2023-12-07 RX ADMIN — TRIAMCINOLONE ACETONIDE 40 MG: 40 INJECTION, SUSPENSION INTRA-ARTICULAR; INTRAMUSCULAR at 14:47

## 2023-12-07 RX ADMIN — BUPIVACAINE HYDROCHLORIDE 5 MG: 2.5 INJECTION, SOLUTION INFILTRATION; PERINEURAL at 14:47

## 2023-12-07 NOTE — PROGRESS NOTES
as strong, but may benefit a subset of patients who have failed other options. Informed patient           viscosupplementation can be performed every 6 months as needed. 8)    Bracing and use of a cane can improve pain and function. 9)    Activity modification. 10)  Maintaining Ideal body weight. Although not specifically listed in the CPGs, ice therapy and topical patches such as Salonpas are good options as well. Other non-surgical options including Coolief (cooled frequency ablation of the geniculate nerves), stem cell injections, PRP injections were discussed. Surgical option, knee arthroplasty discussed. Plan:  Medications-   NSAIDS discussed. The most common side effects from NSAIDs are stomachaches, heartburn, and nausea. NSAIDs may irritate the stomach lining. If the medicine upsets your stomach, you can try taking it with food. But if that doesn't help, talk with your doctor to make sure it's not a more serious problem, such as a stomach ulcer or bleeding in the stomach or intestines. Using NSAIDs may:  Lead to high blood pressure. Make symptoms of heart failure worse. Raise the risk of heart attack, stroke, kidney damage, and skin reactions. Your risks are greater if you take NSAIDs at higher doses or for longer than the label says. People who are older than 72 or who have heart, stomach, or intestinal disease have a higher risk for problems. PT-   A home exercise program was instructed today including ROM exercises and strengthening exercises. The patient verbalized understanding of these exercises as well as the importance of the exercise program to promote return of normal function. If pain intensifies or other problems arise you are to notify the office. Procedures-   The Risks and benefits of corticosteroid intra-articular injections were discussed today. All questions were answered to his satisfaction.  He verbally consented to proceed with intra-articular

## 2024-04-08 ENCOUNTER — TELEPHONE (OUTPATIENT)
Dept: PULMONOLOGY | Age: 64
End: 2024-04-08

## 2024-04-08 NOTE — PROGRESS NOTES
Clarence Vivar         : 1960  [] [facility]     [] [facility]      [x] [facility]     [][facility]    [] [facility]  [] [facility]  [] [facility]   [] [facility] [] Other:  Diagnosis: [x] HARSHAD (G47.33) [] CSA (G47.31) [] Apnea (G47.30)   Length of Need: [] 12 Months [x] 99 Months [] Other:    Machine (SABINA!):  [x] ResMed AirSense     Auto [] Other:       Humidifier: [x] Heated ()        [x] Water chamber replacement ()/ 1 per 6 months        Mask:  Please always start with the mask the patient used during the titraion    [x] Full Face () /1 per 3 months    [x] Patient Choice - Size and fit mask    [] Dispense:     [x] Headgear () / 1 per 3 months    [x] Interface Replacement ()/1 per month            Tubing: [x] Heated ()/1 per 3 months    [] Standard ()/1 per 3 months [] Other:           Filters: [x] Non-disposable ()/1 per 6 months     [x] Ultra-Fine, Disposable ()/2 per month        Miscellaneous: [x] Chin Strap ()/ 1 per 6 months [] O2 bleed-in:       LPM   [] Oximetry on CPAP/Bilevel []  Other:          Start Order Date: 24    MEDICAL JUSTIFICATION:  I, the undersigned, certify that the above prescribed supplies are medically necessary for this patient’s wellbeing.  In my opinion, the supplies are both reasonable and necessary in reference to accepted standards of medicalpractice in treatment of this patient’s condition.    Scottie Zuniga MD      NPI: [id number]       Order Signed Date: 24    Electronically signed by Scottie Zuniga MD on 2024 at 10:37 AM      [facility] [address] [place] [address]  Phone: [phone]  Fax: Markos 106, DO        January 24, 2022     Patient: Emma Holland   YOB: 1985   Date of Visit: 1/24/2022       To Whom It May Concern: It is my medical opinion that Abbie Pouch should remain out of work until 1/29/22. Excuse her 1/22-1/28. .    If you have any questions or concerns, please don't hesitate to call.     Sincerely,        Magdile Ghosh DO

## 2024-04-08 NOTE — TELEPHONE ENCOUNTER
Pt called wanting to get a full face mask the one he has is not working his mouth stays open all night please advise.

## 2024-05-09 ENCOUNTER — OFFICE VISIT (OUTPATIENT)
Dept: SLEEP MEDICINE | Age: 64
End: 2024-05-09
Payer: COMMERCIAL

## 2024-05-09 VITALS
SYSTOLIC BLOOD PRESSURE: 126 MMHG | TEMPERATURE: 98 F | HEIGHT: 70 IN | HEART RATE: 88 BPM | DIASTOLIC BLOOD PRESSURE: 70 MMHG | RESPIRATION RATE: 18 BRPM | BODY MASS INDEX: 37.08 KG/M2 | WEIGHT: 259 LBS | OXYGEN SATURATION: 96 %

## 2024-05-09 DIAGNOSIS — G47.33 OSA TREATED WITH BIPAP: Primary | ICD-10-CM

## 2024-05-09 DIAGNOSIS — G47.39 TREATMENT-EMERGENT CENTRAL SLEEP APNEA: ICD-10-CM

## 2024-05-09 DIAGNOSIS — Z99.89 DEPENDENCE ON OTHER ENABLING MACHINES AND DEVICES: ICD-10-CM

## 2024-05-09 DIAGNOSIS — E66.9 OBESITY (BMI 30-39.9): ICD-10-CM

## 2024-05-09 PROCEDURE — 99214 OFFICE O/P EST MOD 30 MIN: CPT | Performed by: PSYCHIATRY & NEUROLOGY

## 2024-05-09 ASSESSMENT — SLEEP AND FATIGUE QUESTIONNAIRES
HOW LIKELY ARE YOU TO NOD OFF OR FALL ASLEEP WHILE SITTING QUIETLY AFTER LUNCH WITHOUT ALCOHOL: WOULD NEVER DOZE
HOW LIKELY ARE YOU TO NOD OFF OR FALL ASLEEP IN A CAR, WHILE STOPPED FOR A FEW MINUTES IN TRAFFIC: WOULD NEVER DOZE
HOW LIKELY ARE YOU TO NOD OFF OR FALL ASLEEP WHILE LYING DOWN TO REST IN THE AFTERNOON WHEN CIRCUMSTANCES PERMIT: SLIGHT CHANCE OF DOZING
HOW LIKELY ARE YOU TO NOD OFF OR FALL ASLEEP WHILE SITTING AND READING: WOULD NEVER DOZE
HOW LIKELY ARE YOU TO NOD OFF OR FALL ASLEEP WHEN YOU ARE A PASSENGER IN A CAR FOR AN HOUR WITHOUT A BREAK: WOULD NEVER DOZE
HOW LIKELY ARE YOU TO NOD OFF OR FALL ASLEEP WHILE SITTING AND TALKING TO SOMEONE: WOULD NEVER DOZE
HOW LIKELY ARE YOU TO NOD OFF OR FALL ASLEEP WHILE WATCHING TV: SLIGHT CHANCE OF DOZING
ESS TOTAL SCORE: 2
HOW LIKELY ARE YOU TO NOD OFF OR FALL ASLEEP WHILE SITTING INACTIVE IN A PUBLIC PLACE: WOULD NEVER DOZE

## 2024-05-09 NOTE — PROGRESS NOTES
Vitals:  Weight BMI Neck circumference    Wt Readings from Last 3 Encounters:   05/09/24 117.5 kg (259 lb)   12/07/23 116.6 kg (257 lb)   11/30/23 119 kg (262 lb 6.4 oz)    Body mass index is 37.16 kg/m².       BP HR SaO2   BP Readings from Last 3 Encounters:   05/09/24 126/70   11/30/23 114/74   10/30/23 128/70    Pulse Readings from Last 3 Encounters:   05/09/24 88   11/30/23 97   05/11/23 76    SpO2 Readings from Last 3 Encounters:   05/09/24 96%   11/30/23 97%   03/04/23 96%        Themandibular molar Class :   [x]1 []2 []3      Mallampati I Airway Classification:   []1 [x]2 []3 []4      Physical Exam    :        Diagnosis Orders   1. HARSHAD treated with BiPAP        2. Treatment-emergent central sleep apnea        3. Obesity (BMI 30-39.9)        4. Dependence on other enabling machines and devices            Assessment/Plan:   1. Severe Obstructive sleep apnea syndrome syndrome, with treatment emergent central events.   Assessment & Plan:  Chronic, not stable, on PAP at 13/8 cmwp, I will continue the PAP at 13/8 cmwp.  Will consider ASV titration, if he develop daytime sleepiness.   Needs to shave and try F20 airtough.   I Encouraged the patient to use the machine on nightly basis, at least 4 hours a night.  I instructed the patient to adjust the humidifier as needed for dry mouth.    Reviewed and analyzed results of physiologic download from patient's machine and reviewed with patient.  Supplies and parts as needed for her machine.  These are medically necessary.        2. Morbid obesity:  Assessment & Plan:  Chronic-not stable:    The proportionality between weight and AHI.  With 10% weight change, the AHI has a 27% proportionate change.  With 20% weight change, the AHI has a 45-50% proportionate change.       No orders of the defined types were placed in this encounter.      Return in about 1 year (around 5/9/2025) for Reveiwing CPAP usage and compliance report and tro.    Scottie Zuniga MD  Medical

## 2024-07-29 ENCOUNTER — OFFICE VISIT (OUTPATIENT)
Dept: ORTHOPEDIC SURGERY | Age: 64
End: 2024-07-29
Payer: COMMERCIAL

## 2024-07-29 VITALS — HEIGHT: 70 IN | RESPIRATION RATE: 16 BRPM | BODY MASS INDEX: 37.08 KG/M2 | WEIGHT: 259 LBS

## 2024-07-29 DIAGNOSIS — M17.0 PRIMARY OSTEOARTHRITIS OF BOTH KNEES: Primary | ICD-10-CM

## 2024-07-29 PROCEDURE — 20610 DRAIN/INJ JOINT/BURSA W/O US: CPT | Performed by: PHYSICIAN ASSISTANT

## 2024-07-29 RX ORDER — TRIAMCINOLONE ACETONIDE 40 MG/ML
40 INJECTION, SUSPENSION INTRA-ARTICULAR; INTRAMUSCULAR ONCE
Status: COMPLETED | OUTPATIENT
Start: 2024-07-29 | End: 2024-07-29

## 2024-07-29 RX ORDER — BUPIVACAINE HYDROCHLORIDE 2.5 MG/ML
2 INJECTION, SOLUTION INFILTRATION; PERINEURAL ONCE
Status: COMPLETED | OUTPATIENT
Start: 2024-07-29 | End: 2024-07-29

## 2024-07-29 RX ADMIN — BUPIVACAINE HYDROCHLORIDE 5 MG: 2.5 INJECTION, SOLUTION INFILTRATION; PERINEURAL at 15:01

## 2024-07-29 RX ADMIN — TRIAMCINOLONE ACETONIDE 40 MG: 40 INJECTION, SUSPENSION INTRA-ARTICULAR; INTRAMUSCULAR at 15:02

## 2024-07-29 RX ADMIN — BUPIVACAINE HYDROCHLORIDE 5 MG: 2.5 INJECTION, SOLUTION INFILTRATION; PERINEURAL at 15:02

## 2024-07-29 NOTE — PROGRESS NOTES
Subjective:      Patient ID: Clarence Vivar is a 64 y.o.  male who is here for evaluation and treatment of right and left knee pain related to arthritis.   The most recent  injection performed 2023 helped relieve the knee symptoms for about 3 months.    Pain has gradually returned.   Denies recent injury.   Pain is 5-6/10.   Pain is worse with weightbearing activities.   Pain improves somewhat with rest and elevation.    Review of Systems:   Denies fever or chills.  Denies numbness or tingling around the knee.    Past Medical History:   Diagnosis Date    GERD (gastroesophageal reflux disease)     Hyperlipidemia        Family History   Problem Relation Age of Onset    Cancer Father 70        prostate/throat/lung    High Cholesterol Mother     Heart Attack Mother         mild    COPD Mother     No Known Problems Sister     Diabetes Brother     No Known Problems Maternal Grandmother     Heart Disease Maternal Grandfather     Heart Attack Paternal Grandmother     No Known Problems Paternal Grandfather     No Known Problems Sister     No Known Problems Sister     No Known Problems Brother     No Known Problems Brother     No Known Problems Brother     No Known Problems Brother     No Known Problems Brother     No Known Problems Brother        Past Surgical History:   Procedure Laterality Date    COLONOSCOPY  2012    normal dr funez recheck 10 years    COLONOSCOPY  2017    normal dr sims. repeat 10 years    EYE SURGERY Bilateral 2018    relieve pressure in eyes    FOOT SURGERY      left    KNEE CARTILAGE SURGERY      left    VARICOSE VEIN SURGERY Bilateral 2017       Social History     Occupational History    Not on file   Tobacco Use    Smoking status: Former     Current packs/day: 0.00     Average packs/day: 0.5 packs/day for 15.0 years (7.5 ttl pk-yrs)     Types: Cigarettes     Start date: 7/10/1977     Quit date: 7/10/1992     Years since quittin.0     Passive exposure: Past

## 2024-10-30 ENCOUNTER — OFFICE VISIT (OUTPATIENT)
Dept: ORTHOPEDIC SURGERY | Age: 64
End: 2024-10-30
Payer: COMMERCIAL

## 2024-10-30 VITALS — HEIGHT: 70 IN | BODY MASS INDEX: 37.16 KG/M2

## 2024-10-30 DIAGNOSIS — M17.0 PRIMARY OSTEOARTHRITIS OF BOTH KNEES: Primary | ICD-10-CM

## 2024-10-30 PROCEDURE — 20610 DRAIN/INJ JOINT/BURSA W/O US: CPT | Performed by: PHYSICIAN ASSISTANT

## 2024-10-30 RX ORDER — TRIAMCINOLONE ACETONIDE 40 MG/ML
40 INJECTION, SUSPENSION INTRA-ARTICULAR; INTRAMUSCULAR ONCE
Status: COMPLETED | OUTPATIENT
Start: 2024-10-30 | End: 2024-10-30

## 2024-10-30 RX ORDER — BUPIVACAINE HYDROCHLORIDE 2.5 MG/ML
2 INJECTION, SOLUTION INFILTRATION; PERINEURAL ONCE
Status: COMPLETED | OUTPATIENT
Start: 2024-10-30 | End: 2024-10-30

## 2024-10-30 RX ADMIN — BUPIVACAINE HYDROCHLORIDE 5 MG: 2.5 INJECTION, SOLUTION INFILTRATION; PERINEURAL at 15:20

## 2024-10-30 RX ADMIN — TRIAMCINOLONE ACETONIDE 40 MG: 40 INJECTION, SUSPENSION INTRA-ARTICULAR; INTRAMUSCULAR at 15:21

## 2024-10-30 RX ADMIN — TRIAMCINOLONE ACETONIDE 40 MG: 40 INJECTION, SUSPENSION INTRA-ARTICULAR; INTRAMUSCULAR at 15:20

## 2024-10-30 NOTE — PROGRESS NOTES
understood the results of these injections vary from patient to patient and it may take up to 6 weeks to notice full benefit.   It is also understood this is a treatment for knee pain related to arthritis and in no way will this cure the knee arthritis.    Patient educational material regarding visco supplementation provided in the office today.    Will seek authorization for visco-supplementation for the right and left  knee.     The office will call to schedule injection appointments once this has been authorized.     Follow up:    Call or return to clinic if these symptoms worsen or fail to improve as anticipated.                                                 Billy Sena PA-C   Senior Physician Assistant   [facility]                                         Disclaimer:  This note was generated with use of a verbal recognition program (DRAGON) and an attempt was made to check for errors.  It is possible that there are still dictated errors within this office note.  If so, please bring any significant errors to my attention for an addendum.  All efforts were made to ensure that this office note is accurate.

## 2024-12-03 ENCOUNTER — TELEPHONE (OUTPATIENT)
Dept: ORTHOPEDIC SURGERY | Age: 64
End: 2024-12-03

## 2024-12-03 NOTE — TELEPHONE ENCOUNTER
Spoke with patient, he will call back when ready to schedule.      DRUG EUFLEXXA   SERIES OF 3      Approval Auth # [id number] and Dates 11/20/24 - 11-19-25 PER CHIP, CALL PER/ REF:  MALISSA ON 12- 11:13PM EST.  SAVED TO MEDIA..      BILATERAL KNEES   
verbal cues/nonverbal cues (demo/gestures)/1 person assist

## 2024-12-23 ENCOUNTER — OFFICE VISIT (OUTPATIENT)
Dept: FAMILY MEDICINE CLINIC | Age: 64
End: 2024-12-23
Payer: COMMERCIAL

## 2024-12-23 VITALS
TEMPERATURE: 97.2 F | WEIGHT: 276.4 LBS | HEART RATE: 88 BPM | BODY MASS INDEX: 39.57 KG/M2 | SYSTOLIC BLOOD PRESSURE: 128 MMHG | DIASTOLIC BLOOD PRESSURE: 74 MMHG | HEIGHT: 70 IN

## 2024-12-23 DIAGNOSIS — R91.1 NODULE OF RIGHT LUNG: ICD-10-CM

## 2024-12-23 DIAGNOSIS — G47.33 OSA (OBSTRUCTIVE SLEEP APNEA): Primary | ICD-10-CM

## 2024-12-23 DIAGNOSIS — E78.5 ELEVATED LIPIDS: ICD-10-CM

## 2024-12-23 PROCEDURE — 99214 OFFICE O/P EST MOD 30 MIN: CPT | Performed by: FAMILY MEDICINE

## 2024-12-23 RX ORDER — CYCLOBENZAPRINE HCL 10 MG
10 TABLET ORAL 3 TIMES DAILY PRN
COMMUNITY
Start: 2024-12-09

## 2024-12-23 RX ORDER — CALCIUM CARBONATE 300MG(750)
400 TABLET,CHEWABLE ORAL DAILY
COMMUNITY

## 2024-12-23 RX ORDER — ATORVASTATIN CALCIUM 10 MG/1
10 TABLET, FILM COATED ORAL DAILY
Qty: 30 TABLET | Refills: 4 | Status: SHIPPED | OUTPATIENT
Start: 2024-12-23

## 2024-12-23 SDOH — ECONOMIC STABILITY: FOOD INSECURITY: WITHIN THE PAST 12 MONTHS, YOU WORRIED THAT YOUR FOOD WOULD RUN OUT BEFORE YOU GOT MONEY TO BUY MORE.: NEVER TRUE

## 2024-12-23 SDOH — ECONOMIC STABILITY: INCOME INSECURITY: HOW HARD IS IT FOR YOU TO PAY FOR THE VERY BASICS LIKE FOOD, HOUSING, MEDICAL CARE, AND HEATING?: NOT HARD AT ALL

## 2024-12-23 SDOH — ECONOMIC STABILITY: FOOD INSECURITY: WITHIN THE PAST 12 MONTHS, THE FOOD YOU BOUGHT JUST DIDN'T LAST AND YOU DIDN'T HAVE MONEY TO GET MORE.: NEVER TRUE

## 2024-12-23 ASSESSMENT — PATIENT HEALTH QUESTIONNAIRE - PHQ9
SUM OF ALL RESPONSES TO PHQ QUESTIONS 1-9: 0
2. FEELING DOWN, DEPRESSED OR HOPELESS: NOT AT ALL
SUM OF ALL RESPONSES TO PHQ9 QUESTIONS 1 & 2: 0
SUM OF ALL RESPONSES TO PHQ QUESTIONS 1-9: 0
1. LITTLE INTEREST OR PLEASURE IN DOING THINGS: NOT AT ALL
SUM OF ALL RESPONSES TO PHQ QUESTIONS 1-9: 0
SUM OF ALL RESPONSES TO PHQ QUESTIONS 1-9: 0

## 2024-12-23 ASSESSMENT — ENCOUNTER SYMPTOMS
COUGH: 0
BLOOD IN STOOL: 0
SHORTNESS OF BREATH: 0
ABDOMINAL PAIN: 0

## 2024-12-23 NOTE — PROGRESS NOTES
Dispense:  30 tablet     Refill:  4       He [facility]   Colon utd   He is using his cpap machine   I reviewed his cardiac risk   ~10% and he will get the medicine      Interval Hx:  Psa ok on 10/15/24. Hga1c nml same date and cmp and lipid ok same date. LDL was up at 125  U/a nml on 12/9/24      PLAN:   Plan    Consider the shingle and RSV vaccine   Do use the cholesterol medicine. When a refill is needed call us   Call for any problem  Plan on doing fasting blood work in about 6 week after starting the medicine  See back in 6 months            Avelino Ragland MD

## 2024-12-23 NOTE — PATIENT INSTRUCTIONS
Consider the shingle and RSV vaccine   Do use the cholesterol medicine. When a refill is needed call us   Call for any problem  Plan on doing fasting blood work in about 6 week after starting the medicine  See back in 6 months

## 2024-12-31 ENCOUNTER — HOSPITAL ENCOUNTER (OUTPATIENT)
Dept: CT IMAGING | Age: 64
Discharge: HOME | End: 2024-12-31
Attending: FAMILY MEDICINE
Payer: COMMERCIAL

## 2024-12-31 DIAGNOSIS — R91.1 NODULE OF RIGHT LUNG: ICD-10-CM

## 2024-12-31 PROCEDURE — 71250 CT THORAX DX C-: CPT

## 2025-03-20 ENCOUNTER — OFFICE VISIT (OUTPATIENT)
Dept: ORTHOPEDIC SURGERY | Age: 65
End: 2025-03-20

## 2025-03-20 VITALS — BODY MASS INDEX: 38.65 KG/M2 | HEIGHT: 70 IN | WEIGHT: 270 LBS

## 2025-03-20 DIAGNOSIS — M17.0 PRIMARY OSTEOARTHRITIS OF BOTH KNEES: Primary | ICD-10-CM

## 2025-03-20 NOTE — PROGRESS NOTES
POST-PROCEDURE INSTRUCTIONS GIVEN TO PATIENT:   He was advised to ice the knee for 15-20 minutes to relieve any injection site related pain. Decrease activity for the next 24 to 48 hours. May use prescription or OTC pain relievers as needed    Repeat x-rays of the left and right knee next visit.    FOLLOW-UP: 1 week  As directed or call or return to clinic if these symptoms worsen or fail to improve as anticipated. If at any time you are concerned you may contact the office for further instructions or care.                                         Billy Sena PA-C  Senior Physician Assistant  [facility]                                         Disclaimer:  This note was generated with use of a verbal recognition program (DRAGON) and an attempt was made to check for errors.  It is possible that there are still dictated errors within this office note.  If so, please bring any significant errors to my attention for an addendum.  All efforts were made to ensure that this office note is accurate.

## 2025-03-27 ENCOUNTER — OFFICE VISIT (OUTPATIENT)
Dept: ORTHOPEDIC SURGERY | Age: 65
End: 2025-03-27

## 2025-03-27 VITALS — BODY MASS INDEX: 38.74 KG/M2 | HEIGHT: 70 IN

## 2025-03-27 DIAGNOSIS — M17.0 PRIMARY OSTEOARTHRITIS OF BOTH KNEES: Primary | ICD-10-CM

## 2025-03-27 NOTE — PROGRESS NOTES
Subjective:    He  is here for visco supplementation injection # 2 for the right and left knee.      Objective:   Height 1.778 m (5' 10\").    Examination of the right and left knee:   Inspection of the skin reveals no unusual erythema.  There is mild intra-articular effusion.  There is pain associated with ROM testing.   Extensor Mechanism is intact.     AP and PA standing, lateral and sunrise views of left knee:   No acute fractures or dislocations noted.   Knee x-rays demonstrate osteoarthritis.   Medial compartment-    4/4 Kellgren and Carlitos Classification.   Lateral compartment-    2/4 Kellgren and Carlitos Classification.   PF compartment-          2/4 Kellgren and Carlitos Classification.     AP and PA standing, lateral and sunrise views of right knee:   No acute fractures or dislocations noted.   Knee x-rays demonstrate osteoarthritis.   Medial compartment-    3/4 Kellgren and Carlitos Classification.   Lateral compartment-    2/4 Kellgren and Carlitos Classification.   PF compartment-          2/4 Kellgren and Carlitos Classification.           Diagnosis:    ICD-10-CM    1. Primary osteoarthritis of both knees  M17.0 XR KNEE BILATERAL STANDING     XR KNEE RIGHT (3 VIEWS)     XR KNEE LEFT (3 VIEWS)     DRAIN/INJECT LARGE JOINT/BURSA     sodium hyaluronate (EUFLEXXA, HYALGAN) injection 20 mg     sodium hyaluronate (EUFLEXXA, HYALGAN) injection 20 mg          Assessment/ Plan:  Knee pain related to knee arthritis.     Discussed at length conservative treatment of knee symptoms/arthritis, according to AAOS Clinical Practice Guidelines:  Evidence for intra-articular hyaluronic acid injections (viscosupplementation) is not as strong, but may benefit a subset of patients who have failed other options.  Informed patient viscosupplementation can be performed every 6 months as needed.     Risks and benefits of viscosupplementation injections were also discussed today.  Risks include but not limited to increased 
225.9

## 2025-04-03 ENCOUNTER — OFFICE VISIT (OUTPATIENT)
Dept: ORTHOPEDIC SURGERY | Age: 65
End: 2025-04-03

## 2025-04-03 DIAGNOSIS — M17.0 PRIMARY OSTEOARTHRITIS OF BOTH KNEES: Primary | ICD-10-CM

## 2025-04-03 NOTE — PROGRESS NOTES
Subjective:    He  is here for visco supplementation injection # 3 for the right and left knee.      Objective:   There were no vitals taken for this visit.    Examination of the right and left knee:   Inspection of the skin reveals no unusual erythema.  There is mild intra-articular effusion.  There is mild pain associated with ROM testing.   Extensor Mechanism is intact.        Diagnosis:    ICD-10-CM    1. Primary osteoarthritis of both knees  M17.0 DRAIN/INJECT LARGE JOINT/BURSA     sodium hyaluronate (EUFLEXXA, HYALGAN) injection 20 mg     sodium hyaluronate (EUFLEXXA, HYALGAN) injection 20 mg          Assessment/ Plan:  Knee pain related to knee arthritis.     Discussed at length conservative treatment of knee symptoms/arthritis, according to AAOS Clinical Practice Guidelines:  Evidence for intra-articular hyaluronic acid injections (viscosupplementation) is not as strong, but may benefit a subset of patients who have failed other options.  Informed patient viscosupplementation can be performed every 6 months as needed.     Risks and benefits of viscosupplementation injections were also discussed today.  Risks include but not limited to increased pain, bleeding, infection, failure to provide improvement, neurovascular injury. He had ample time for discussion and questions were answered to his satisfaction. He verbally consented to proceed with intra-articular injection today.    Proceed with injection # 3 in the series of 3 injections for the  right and left knee.    PROCEDURE NOTE:  PRE-PROCEDURE DIAGNOSIS: DJD knee  POST-PROCEDURE DIAGNOSIS: DJD knee  With Clarence Vivar permission, his  right and left knee was prepped in standard sterile fashion with  Alcohol. The prefilled injection of the visco supplementation ( Euflexxa 20 mg/ 2 ML ) was injected into the  anterior-lateral joint space compartment without difficulty.  he tolerated the procedure well without difficulty.  A band-aid was applied.

## 2025-05-12 ENCOUNTER — OFFICE VISIT (OUTPATIENT)
Dept: SLEEP MEDICINE | Age: 65
End: 2025-05-12
Payer: COMMERCIAL

## 2025-05-12 VITALS
HEIGHT: 70 IN | HEART RATE: 71 BPM | DIASTOLIC BLOOD PRESSURE: 94 MMHG | BODY MASS INDEX: 40.74 KG/M2 | RESPIRATION RATE: 18 BRPM | TEMPERATURE: 96.7 F | OXYGEN SATURATION: 97 % | SYSTOLIC BLOOD PRESSURE: 122 MMHG | WEIGHT: 284.6 LBS

## 2025-05-12 DIAGNOSIS — G47.39 TREATMENT-EMERGENT CENTRAL SLEEP APNEA: ICD-10-CM

## 2025-05-12 DIAGNOSIS — E66.01 CLASS 2 SEVERE OBESITY DUE TO EXCESS CALORIES WITH SERIOUS COMORBIDITY AND BODY MASS INDEX (BMI) OF 38.0 TO 38.9 IN ADULT (HCC): ICD-10-CM

## 2025-05-12 DIAGNOSIS — G47.33 OSA ON CPAP: Primary | ICD-10-CM

## 2025-05-12 DIAGNOSIS — E66.812 CLASS 2 SEVERE OBESITY DUE TO EXCESS CALORIES WITH SERIOUS COMORBIDITY AND BODY MASS INDEX (BMI) OF 38.0 TO 38.9 IN ADULT (HCC): ICD-10-CM

## 2025-05-12 DIAGNOSIS — Z99.89 DEPENDENCE ON OTHER ENABLING MACHINES AND DEVICES: ICD-10-CM

## 2025-05-12 PROCEDURE — 99214 OFFICE O/P EST MOD 30 MIN: CPT | Performed by: PSYCHIATRY & NEUROLOGY

## 2025-05-12 PROCEDURE — G2211 COMPLEX E/M VISIT ADD ON: HCPCS | Performed by: PSYCHIATRY & NEUROLOGY

## 2025-05-12 ASSESSMENT — SLEEP AND FATIGUE QUESTIONNAIRES
HOW LIKELY ARE YOU TO NOD OFF OR FALL ASLEEP WHILE SITTING QUIETLY AFTER LUNCH WITHOUT ALCOHOL: SLIGHT CHANCE OF DOZING
HOW LIKELY ARE YOU TO NOD OFF OR FALL ASLEEP WHILE SITTING AND TALKING TO SOMEONE: WOULD NEVER DOZE
HOW LIKELY ARE YOU TO NOD OFF OR FALL ASLEEP WHILE SITTING AND READING: WOULD NEVER DOZE
HOW LIKELY ARE YOU TO NOD OFF OR FALL ASLEEP WHILE WATCHING TV: SLIGHT CHANCE OF DOZING
HOW LIKELY ARE YOU TO NOD OFF OR FALL ASLEEP WHILE LYING DOWN TO REST IN THE AFTERNOON WHEN CIRCUMSTANCES PERMIT: HIGH CHANCE OF DOZING
ESS TOTAL SCORE: 5
HOW LIKELY ARE YOU TO NOD OFF OR FALL ASLEEP IN A CAR, WHILE STOPPED FOR A FEW MINUTES IN TRAFFIC: WOULD NEVER DOZE
HOW LIKELY ARE YOU TO NOD OFF OR FALL ASLEEP WHEN YOU ARE A PASSENGER IN A CAR FOR AN HOUR WITHOUT A BREAK: WOULD NEVER DOZE
HOW LIKELY ARE YOU TO NOD OFF OR FALL ASLEEP WHILE SITTING INACTIVE IN A PUBLIC PLACE: WOULD NEVER DOZE

## 2025-05-12 NOTE — PROGRESS NOTES
MD ANGELA Zuniga Board Certified in Sleep Medicine  Certified in Behavioral Sleep Medicine  Board Certified in Neurology [facility]  [address]   [address]  [place]  [address]  [phone]   [facility]  [address]  [address]  [place], [place] [address]                      [facility] [place] [facility]  [address] [address] [phone]    Subjective:     Patient ID: Clarence Vivar is a 65 y.o. male.    Chief Complaint   Patient presents with    Follow-up    Sleep Apnea       HPI:        Clarence Vivar is a 65 y.o. male was seen today as a follow for severe obstructive sleep apnea with apnea hypopnea index of 32.5/hr with lowest O2 saturation of 76%, patient spent about 43.5 minutes below 90% (weight was 275 pounds).   Patient is using the PAP machine about 100% of the time, more than 4 hours a night about  73 %, in total average of 5:02 hours a night in last 90 days.  Currently on PAP at 13/8 cm (), the AHI is only 3.1 events per hour at this pressure.  Patient improved regarding daytime sleepiness and fatigue, wakes up refreshed in the morning.  The Patient scored   on [place] Sleepiness Scale ( more than 10 is indicative of daytime sleepiness)   Patient has no problem with PAP pressure or mask, uses N20  Wakes up in the morning with dry mouth, the setting of the heated humidifier.  Patient has trouble falling asleep while on PAP machine.  Patient improved after mask fitting.  BP is stable. Has gained 25 pounds since last visit. 259  DOT/CDL - N/A      Previous Report(s)Reviewed: historical medical records         Social History     Socioeconomic History    Marital status:      Spouse name: Not on file    Number of children: Not on file    Years of education: Not on file    Highest education level: Not on file   Occupational History    Not on file   Tobacco

## 2025-05-12 NOTE — PROGRESS NOTES
Clarence CIERRA IzaguirreVivar         : 1960  [] [facility]     [] [facility]      [x] [facility]     [][facility]    [] [facility]  [] [facility]   [] [facility]  [] [facility] [] [employer] [] [facility] [] [facility] [] [facility] [] [facility]  Diagnosis: [x] HARSHAD (G47.33) [] CSA (G47.31) [] Apnea (G47.30)   Length of Need: [] 12 Months [x] 99 Months [] Other:    Machine (SABINA!):  [x] ResMed AirSense     Auto [] Other:       Humidifier: [x] Heated ()        [x] Water chamber replacement ()/ 1 per 6 months        Mask:  Please always start with the mask the patient used during the titraion   [x] Nasal () /1 per 3 months    [x] Patient choice -Size and fit mask    [] Dispense:     [x] Headgear () / 1 per 6 months    [x] Replacement Nasal Cushion ()/2 per month             Tubing: [x] Heated ()/1 per 3 months    [] Standard ()/1 per 3 months [] Other:           Filters: [x] Non-disposable ()/1 per 6 months     [x] Ultra-Fine, Disposable ()/2 per month        Miscellaneous: [x] Chin Strap ()/ 1 per 6 months [] O2 bleed-in:       LPM   [] Oximetry on CPAP/Bilevel []  Other:          Start Order Date: 25    MEDICAL JUSTIFICATION:  I, the undersigned, certify that the above prescribed supplies are medically necessary for this patient’s wellbeing.  In my opinion, the supplies are both reasonable and necessary in reference to accepted standards of medicalpractice in treatment of this patient’s condition.    Scottie Zuniga MD      NPI: [id number]       Order Signed Date: 25    Electronically signed by Scottie Zuniga MD on 2025 at 2:05 PM

## 2025-05-29 ENCOUNTER — OFFICE VISIT (OUTPATIENT)
Dept: ORTHOPEDIC SURGERY | Age: 65
End: 2025-05-29

## 2025-05-29 ENCOUNTER — OFFICE VISIT (OUTPATIENT)
Dept: FAMILY MEDICINE CLINIC | Age: 65
End: 2025-05-29
Payer: COMMERCIAL

## 2025-05-29 ENCOUNTER — TELEPHONE (OUTPATIENT)
Dept: ORTHOPEDIC SURGERY | Age: 65
End: 2025-05-29

## 2025-05-29 VITALS
WEIGHT: 285 LBS | HEIGHT: 70 IN | SYSTOLIC BLOOD PRESSURE: 122 MMHG | BODY MASS INDEX: 40.8 KG/M2 | TEMPERATURE: 98.5 F | HEART RATE: 89 BPM | DIASTOLIC BLOOD PRESSURE: 82 MMHG | OXYGEN SATURATION: 94 %

## 2025-05-29 VITALS — WEIGHT: 284 LBS | HEIGHT: 70 IN | BODY MASS INDEX: 40.66 KG/M2

## 2025-05-29 DIAGNOSIS — M17.0 PRIMARY OSTEOARTHRITIS OF BOTH KNEES: Primary | ICD-10-CM

## 2025-05-29 DIAGNOSIS — I87.2 VENOUS INSUFFICIENCY: Primary | ICD-10-CM

## 2025-05-29 DIAGNOSIS — S80.811A ABRASION OF RIGHT LOWER EXTREMITY, INITIAL ENCOUNTER: ICD-10-CM

## 2025-05-29 PROCEDURE — 99213 OFFICE O/P EST LOW 20 MIN: CPT | Performed by: INTERNAL MEDICINE

## 2025-05-29 RX ORDER — BUPIVACAINE HYDROCHLORIDE 2.5 MG/ML
2 INJECTION, SOLUTION INFILTRATION; PERINEURAL ONCE
Status: COMPLETED | OUTPATIENT
Start: 2025-05-29 | End: 2025-05-29

## 2025-05-29 RX ORDER — TRIAMCINOLONE ACETONIDE 40 MG/ML
40 INJECTION, SUSPENSION INTRA-ARTICULAR; INTRAMUSCULAR ONCE
Status: COMPLETED | OUTPATIENT
Start: 2025-05-29 | End: 2025-05-29

## 2025-05-29 RX ADMIN — TRIAMCINOLONE ACETONIDE 40 MG: 40 INJECTION, SUSPENSION INTRA-ARTICULAR; INTRAMUSCULAR at 15:24

## 2025-05-29 RX ADMIN — BUPIVACAINE HYDROCHLORIDE 5 MG: 2.5 INJECTION, SOLUTION INFILTRATION; PERINEURAL at 15:24

## 2025-05-29 SDOH — ECONOMIC STABILITY: FOOD INSECURITY: WITHIN THE PAST 12 MONTHS, YOU WORRIED THAT YOUR FOOD WOULD RUN OUT BEFORE YOU GOT MONEY TO BUY MORE.: NEVER TRUE

## 2025-05-29 SDOH — ECONOMIC STABILITY: FOOD INSECURITY: WITHIN THE PAST 12 MONTHS, THE FOOD YOU BOUGHT JUST DIDN'T LAST AND YOU DIDN'T HAVE MONEY TO GET MORE.: NEVER TRUE

## 2025-05-29 ASSESSMENT — PATIENT HEALTH QUESTIONNAIRE - PHQ9
2. FEELING DOWN, DEPRESSED OR HOPELESS: NOT AT ALL
SUM OF ALL RESPONSES TO PHQ QUESTIONS 1-9: 0
1. LITTLE INTEREST OR PLEASURE IN DOING THINGS: NOT AT ALL
SUM OF ALL RESPONSES TO PHQ QUESTIONS 1-9: 0

## 2025-05-29 NOTE — PROGRESS NOTES
Subjective:      Patient ID: Clarence Vivar is a 65 y.o.  male who is here for evaluation and treatment of right and left knee pain related to arthritis.   The most recent cortisone injection performed 10/30/24 helped relieve the knee symptoms.    Minimal relief with visco completed 4/3/25.  Pain has gradually returned.   Denies recent injury.   Pain is 6/10.   Pain is worse with weightbearing activities.   Pain improves somewhat with rest and elevation.    Review of Systems:   Denies fever or chills.  Denies numbness or tingling around the knee.    Past Medical History:   Diagnosis Date    GERD (gastroesophageal reflux disease)     Hyperlipidemia        Family History   Problem Relation Age of Onset    Cancer Father 70        prostate/throat/lung    High Cholesterol Mother     Heart Attack Mother         mild    COPD Mother     No Known Problems Sister     Diabetes Brother     No Known Problems Maternal Grandmother     Heart Disease Maternal Grandfather     Heart Attack Paternal Grandmother     No Known Problems Paternal Grandfather     No Known Problems Sister     No Known Problems Sister     No Known Problems Brother     No Known Problems Brother     No Known Problems Brother     No Known Problems Brother     No Known Problems Brother     No Known Problems Brother        Past Surgical History:   Procedure Laterality Date    COLONOSCOPY  2012    normal dr funez recheck 10 years    COLONOSCOPY  2017    normal dr sims. repeat 10 years    EYE SURGERY Bilateral 2018    relieve pressure in eyes    FOOT SURGERY      left    KNEE CARTILAGE SURGERY      left    VARICOSE VEIN SURGERY Bilateral 2017       Social History     Occupational History    Not on file   Tobacco Use    Smoking status: Former     Current packs/day: 0.00     Average packs/day: 0.5 packs/day for 15.0 years (7.5 ttl pk-yrs)     Types: Cigarettes     Start date: 7/10/1977     Quit date: 7/10/1992     Years since quittin.9

## 2025-05-29 NOTE — PATIENT INSTRUCTIONS
Referral to vascular surgeon  Topical abx ointment.   Leg wraps daily.  Fasting blood work order in computer. Can go to lab to get done.  F/u in the next couple of weeks

## 2025-05-29 NOTE — TELEPHONE ENCOUNTER
----- Message from Gabrielle FRASER sent at 5/29/2025  8:43 AM EDT -----  Regarding: Specialty Message to Provider  Specialty Message to Provider    Relationship to Patient: Self     Patient Message: THE Pt CALLING TO [facility] THAT THE GEL INJECTIONS MIGHT'VE HELPED A LITTLE BUT NOT FOR LONG.     WHAT IS THE NEXT STEP FOR HIM? PLEASE CALL TO ADVISE.   --------------------------------------------------------------------------------------------------------------------------    Call Back Information: OK to leave message on voicemail  Preferred Call Back Number: [phone]   [facility] accepted pt for Home Health.   Information added to AVS.  Audra Sierra, RN CRM

## 2025-05-30 RX ORDER — ATORVASTATIN CALCIUM 10 MG/1
10 TABLET, FILM COATED ORAL DAILY
Qty: 30 TABLET | Refills: 4 | Status: SHIPPED | OUTPATIENT
Start: 2025-05-30

## 2025-06-06 DIAGNOSIS — E78.5 ELEVATED LIPIDS: ICD-10-CM

## 2025-06-06 LAB
ALT SERPL-CCNC: 47 U/L (ref 10–40)
AST SERPL-CCNC: 20 U/L (ref 15–37)
CHOLEST SERPL-MCNC: 152 MG/DL (ref 0–199)
HDLC SERPL-MCNC: 43 MG/DL (ref 40–60)
LDLC SERPL CALC-MCNC: 98 MG/DL
TRIGL SERPL-MCNC: 56 MG/DL (ref 0–150)
VLDLC SERPL CALC-MCNC: 11 MG/DL

## 2025-06-09 ENCOUNTER — RESULTS FOLLOW-UP (OUTPATIENT)
Dept: FAMILY MEDICINE CLINIC | Age: 65
End: 2025-06-09

## 2025-06-09 RX ORDER — ATORVASTATIN CALCIUM 20 MG/1
20 TABLET, FILM COATED ORAL DAILY
Qty: 90 TABLET | Refills: 1 | Status: SHIPPED | OUTPATIENT
Start: 2025-06-09

## 2025-06-12 ENCOUNTER — OFFICE VISIT (OUTPATIENT)
Dept: FAMILY MEDICINE CLINIC | Age: 65
End: 2025-06-12
Payer: COMMERCIAL

## 2025-06-12 VITALS
TEMPERATURE: 97.9 F | OXYGEN SATURATION: 94 % | BODY MASS INDEX: 40.06 KG/M2 | HEIGHT: 70 IN | HEART RATE: 72 BPM | WEIGHT: 279.8 LBS | DIASTOLIC BLOOD PRESSURE: 80 MMHG | SYSTOLIC BLOOD PRESSURE: 128 MMHG

## 2025-06-12 DIAGNOSIS — I87.2 CHRONIC VENOUS INSUFFICIENCY: ICD-10-CM

## 2025-06-12 DIAGNOSIS — I83.893 VARICOSE VEINS OF BOTH LOWER EXTREMITIES WITH COMPLICATIONS: Primary | ICD-10-CM

## 2025-06-12 PROCEDURE — 99213 OFFICE O/P EST LOW 20 MIN: CPT | Performed by: INTERNAL MEDICINE

## 2025-06-12 ASSESSMENT — ENCOUNTER SYMPTOMS
WHEEZING: 0
COUGH: 0
SHORTNESS OF BREATH: 0

## 2025-06-12 NOTE — PROGRESS NOTES
Clarence Vivar (:  1960) is a 65 y.o. male,Established patient, here for evaluation of the following chief complaint(s):  Follow-up (Recheck legs and Discuss lab results )      Subjective       HPI    Pt is seeing vascular doc in a [facility].  Swelling is stable. Compression stockings are helping.       Review of Systems   Respiratory:  Negative for cough, shortness of breath and wheezing.    Cardiovascular:  Positive for leg swelling (chronic). Negative for chest pain and palpitations.             Objective     /80 (BP Site: Left Upper Arm, Patient Position: Sitting, BP Cuff Size: Large Adult)   Pulse 72   Temp 97.9 °F (36.6 °C) (Temporal)   Ht 1.778 m (5' 10\")   Wt 126.9 kg (279 lb 12.8 oz)   SpO2 94%   BMI 40.15 kg/m²      Physical Exam  Constitutional:       Appearance: Normal appearance.   Cardiovascular:      Rate and Rhythm: Normal rate and regular rhythm.      Heart sounds: Normal heart sounds.   Pulmonary:      Effort: Pulmonary effort is normal.      Breath sounds: Normal breath sounds.   Musculoskeletal:      Right lower leg: Edema present.      Left lower leg: Edema present.   Skin:     Comments: Varicosities in legs   Neurological:      Mental Status: He is alert.              Clarence was seen today for follow-up.    Diagnoses and all orders for this visit:    Varicose veins of both lower extremities with complications    Chronic venous insufficiency    Symptoms improving with compression stockings.  Follow-up with vascular surgeon.    Return in about 6 months (around 2025) for annual wellness visit, Follow up for chronic medical conditions.      An electronic signature was used to authenticate this note.    --Madhuri Cruz MD

## 2025-07-09 ENCOUNTER — OFFICE VISIT (OUTPATIENT)
Dept: VASCULAR SURGERY | Age: 65
End: 2025-07-09
Payer: COMMERCIAL

## 2025-07-09 VITALS
WEIGHT: 280 LBS | HEART RATE: 72 BPM | OXYGEN SATURATION: 96 % | SYSTOLIC BLOOD PRESSURE: 122 MMHG | HEIGHT: 70 IN | BODY MASS INDEX: 40.09 KG/M2 | DIASTOLIC BLOOD PRESSURE: 80 MMHG

## 2025-07-09 DIAGNOSIS — I83.893 VARICOSE VEINS OF BOTH LOWER EXTREMITIES WITH COMPLICATIONS: Primary | ICD-10-CM

## 2025-07-09 PROCEDURE — 99214 OFFICE O/P EST MOD 30 MIN: CPT | Performed by: SURGERY

## 2025-07-09 NOTE — PROGRESS NOTES
Subjective:      Patient ID: Clarence Vivar is a 65 y.o. male.    HPI Referral back from Madhuri Cruz MD after original referral from Avelino Ragland MD. S/P B GSV laser ablation 2017 by ISAURO Varela.  Has noted enlargement of previously present varicosities over the years as the problems with his legs have persisted or worsened.  No history of SVT or DVT. Recent 1 week duration weeping skin crack R upper lateral calf that healed rapidly after he resumed wearing his prescribed compression stockings. Remote R thigh venous bleed at prominent varicosities following dog scratch.  Right leg symptoms are much worse than the left including heaviness and discomfort.      Past Medical History:   Diagnosis Date    GERD (gastroesophageal reflux disease)     Hyperlipidemia      Past Surgical History:   Procedure Laterality Date    COLONOSCOPY  11/28/2012    normal dr funez recheck 10 years    COLONOSCOPY  08/25/2017    normal dr sims. repeat 10 years    EYE SURGERY Bilateral 03/2018    relieve pressure in eyes    FOOT SURGERY      left    KNEE CARTILAGE SURGERY      left    VARICOSE VEIN SURGERY Bilateral 11/2017     Allergies   Allergen Reactions    Augmentin [Amoxicillin-Pot Clavulanate] Rash     Pt reports not an allergy 7/9/25    Sulfa Antibiotics Hives     Current Outpatient Medications   Medication Sig Dispense Refill    atorvastatin (LIPITOR) 20 MG tablet Take 1 tablet by mouth daily 90 tablet 1    Magnesium 400 MG TABS Take 400 mg by mouth daily      vitamin D3 (CHOLECALCIFEROL) 25 MCG (1000 UT) TABS tablet Take 2 tablets by mouth daily 60 tablet 0     No current facility-administered medications for this visit.     Social History     Socioeconomic History    Marital status:      Spouse name: Not on file    Number of children: Not on file    Years of education: Not on file    Highest education level: Not on file   Occupational History    Not on file   Tobacco Use    Smoking status: Former     Current packs/day: 0.00

## 2025-07-14 ENCOUNTER — HOSPITAL ENCOUNTER (EMERGENCY)
Age: 65
Discharge: HOME | End: 2025-07-15
Attending: EMERGENCY MEDICINE
Payer: COMMERCIAL

## 2025-07-14 DIAGNOSIS — I83.899 BLEEDING FROM VARICOSE VEIN: Primary | ICD-10-CM

## 2025-07-14 PROCEDURE — 85610 PROTHROMBIN TIME: CPT

## 2025-07-14 PROCEDURE — 85730 THROMBOPLASTIN TIME PARTIAL: CPT

## 2025-07-14 PROCEDURE — 99283 EMERGENCY DEPT VISIT LOW MDM: CPT

## 2025-07-14 PROCEDURE — 83605 ASSAY OF LACTIC ACID: CPT

## 2025-07-14 PROCEDURE — 80053 COMPREHEN METABOLIC PANEL: CPT

## 2025-07-14 PROCEDURE — 85025 COMPLETE CBC W/AUTO DIFF WBC: CPT

## 2025-07-15 VITALS
WEIGHT: 278.66 LBS | HEART RATE: 62 BPM | DIASTOLIC BLOOD PRESSURE: 72 MMHG | TEMPERATURE: 99.5 F | SYSTOLIC BLOOD PRESSURE: 123 MMHG | HEIGHT: 70 IN | OXYGEN SATURATION: 97 % | BODY MASS INDEX: 39.89 KG/M2 | RESPIRATION RATE: 16 BRPM

## 2025-07-15 LAB
ALBUMIN SERPL-MCNC: 3.9 G/DL (ref 3.4–5)
ALBUMIN/GLOB SERPL: 1.5 {RATIO} (ref 1.1–2.2)
ALP SERPL-CCNC: 88 U/L (ref 40–129)
ALT SERPL-CCNC: 32 U/L (ref 10–40)
ANION GAP SERPL CALCULATED.3IONS-SCNC: 10 MMOL/L (ref 3–16)
APTT BLD: 25.8 SEC (ref 22.8–35.8)
AST SERPL-CCNC: 25 U/L (ref 15–37)
BASOPHILS # BLD: 0 K/UL (ref 0–0.2)
BASOPHILS NFR BLD: 0.1 %
BILIRUB SERPL-MCNC: 0.3 MG/DL (ref 0–1)
BUN SERPL-MCNC: 14 MG/DL (ref 7–20)
CALCIUM SERPL-MCNC: 9.1 MG/DL (ref 8.3–10.6)
CHLORIDE SERPL-SCNC: 105 MMOL/L (ref 99–110)
CO2 SERPL-SCNC: 23 MMOL/L (ref 21–32)
CREAT SERPL-MCNC: 0.8 MG/DL (ref 0.8–1.3)
DEPRECATED RDW RBC AUTO: 14.3 % (ref 12.4–15.4)
EOSINOPHIL # BLD: 0.1 K/UL (ref 0–0.6)
EOSINOPHIL NFR BLD: 1.1 %
GFR SERPLBLD CREATININE-BSD FMLA CKD-EPI: >90 ML/MIN/{1.73_M2}
GLUCOSE SERPL-MCNC: 107 MG/DL (ref 70–99)
HCT VFR BLD AUTO: 46.8 % (ref 40.5–52.5)
HGB BLD-MCNC: 15.8 G/DL (ref 13.5–17.5)
INR PPP: 1.03 (ref 0.86–1.14)
LACTATE BLDV-SCNC: 1.5 MMOL/L (ref 0.4–2)
LYMPHOCYTES # BLD: 1.3 K/UL (ref 1–5.1)
LYMPHOCYTES NFR BLD: 20.2 %
MCH RBC QN AUTO: 30.2 PG (ref 26–34)
MCHC RBC AUTO-ENTMCNC: 33.7 G/DL (ref 31–36)
MCV RBC AUTO: 89.6 FL (ref 80–100)
MONOCYTES # BLD: 0.8 K/UL (ref 0–1.3)
MONOCYTES NFR BLD: 12 %
NEUTROPHILS # BLD: 4.2 K/UL (ref 1.7–7.7)
NEUTROPHILS NFR BLD: 66.6 %
PLATELET # BLD AUTO: 137 K/UL (ref 135–450)
PMV BLD AUTO: 8.7 FL (ref 5–10.5)
POTASSIUM SERPL-SCNC: 4.3 MMOL/L (ref 3.5–5.1)
PROT SERPL-MCNC: 6.5 G/DL (ref 6.4–8.2)
PROTHROMBIN TIME: 13.8 SEC (ref 12.1–14.9)
RBC # BLD AUTO: 5.22 M/UL (ref 4.2–5.9)
SODIUM SERPL-SCNC: 138 MMOL/L (ref 136–145)
WBC # BLD AUTO: 6.4 K/UL (ref 4–11)

## 2025-07-15 NOTE — ED PROVIDER NOTES
[facility] EMERGENCY DEPARTMENT     EMERGENCY DEPARTMENT ENCOUNTER            Pt Name: Clarence Vivar   MRN: [id number]   Birthdate 1960   Date of evaluation: 7/14/2025   Provider: Jossy Cuellar MD   PCP: Madhuri Cruz MD   Note Started: 11:18 PM EDT 7/14/25          CHIEF COMPLAINT     Chief Complaint   Patient presents with    Wound Check             HISTORY OF PRESENT ILLNESS:   History from : Patient   Limitations to history : None     Clarence Vivar is a 65 y.o. male who presents with bleeding varicose vein to right lower extremity.  Patient reports that he scratched a small scab off and the bleeding started bleeding was very brisk and pulsatile at the time.  By the time he reached the emergency department the bleeding has slowed to a stop.  Patient is not on any blood thinning medications and denies taking over-the-counter NSAIDs.  Patient has had varicose vein bleeding in the past which is required sutures.  He does have a vascular doctor which he has an upcoming appointment with for ultrasound.  Patient also was found to have tachycardia and a low-grade fever upon arrival however he has no other infectious symptoms.    Nursing Notes were all reviewed and agreed with, or any disagreements were addressed in the HPI.     REVIEW OF SYSTEMS :    Positives and Pertinent negatives as per HPI.      MEDICAL HISTORY   has a past medical history of GERD (gastroesophageal reflux disease) and Hyperlipidemia.    Past Surgical History:   Procedure Laterality Date    COLONOSCOPY  11/28/2012    normal dr funez recheck 10 years    COLONOSCOPY  08/25/2017    normal dr sims. repeat 10 years    EYE SURGERY Bilateral 03/2018    relieve pressure in eyes    FOOT SURGERY      left    KNEE CARTILAGE SURGERY      left    VARICOSE VEIN SURGERY Bilateral 11/2017      CURRENTMEDICATIONS       Discharge Medication List as of 7/15/2025  2:41 AM        CONTINUE these medications which have NOT CHANGED    Details

## 2025-07-15 NOTE — DISCHARGE INSTRUCTIONS
Keep dressing in place for the next 24 to 48 hours after that you may keep a small bandage over the area until completely healed and Dermabond has flaked off.  Please follow-up closely with your primary doctor as well as your vascular doctor.  Hold pressure over area if it would start to bleed for 30 minutes and if this does not work return to emergency department.  Return for any other new or worrisome symptoms

## 2025-07-15 NOTE — ED NOTES
Patient DCed from ED at this time. Discussed AVS, follow up, education, and prescriptions. All questions answered. Reinforced that should symptoms persist or worsen to return to the ED. Pt verbalized understanding. Patient wheeled out of ED in wheelchair.

## 2025-07-24 ENCOUNTER — OFFICE VISIT (OUTPATIENT)
Dept: VASCULAR SURGERY | Age: 65
End: 2025-07-24
Payer: COMMERCIAL

## 2025-07-24 VITALS
OXYGEN SATURATION: 97 % | HEART RATE: 72 BPM | SYSTOLIC BLOOD PRESSURE: 114 MMHG | WEIGHT: 277.8 LBS | HEIGHT: 70 IN | DIASTOLIC BLOOD PRESSURE: 72 MMHG | BODY MASS INDEX: 39.77 KG/M2

## 2025-07-24 DIAGNOSIS — I83.893 VARICOSE VEINS OF BOTH LOWER EXTREMITIES WITH COMPLICATIONS: Primary | ICD-10-CM

## 2025-07-24 PROCEDURE — 99213 OFFICE O/P EST LOW 20 MIN: CPT | Performed by: SURGERY

## 2025-07-24 NOTE — PROGRESS NOTES
Seen back for symptomatic varicose veins B legs will severe skin stasis changes (R>L) and discomfort.  Pt has continued wearing the prescribed KH 20/30  mmHg stockings.  No reported edema, tender cord, skin changes or ulceration however had a R ankle venous bleed requiring ER visit 2 weeks ago..  Recent venous reflux scan performed on 7/23/2025 at [facility].    EXAM:  No edema, ulceration or dermatitis.    All VVs soft, nontender without erythema.    R ankle site of bleed sealed with dermabond.     VRS - no deep reflux; S/P B GSV ablations with segmental patency and reflux - R BK GSV, L thigh GSV. All feed VVs    A/P: Chronic segmental superficial venous insufficiency B legs with secondary complicated varicose veins (venous hemorrhage & skin stasis changes)   SIGNIFICANT B AXIAL REFLUX with LARGE VARICOSITIES.   Surgery is recommended - 2 separate procedures: 1) R BK GSV RFA + stab phlebectomies (Med necessary); 2) L thigh GSV RFA + stab  phlebectomies (Med necessary).   This was discussed in terms that the patient could understand.   The risks (bleeding, clotting, bruising, swelling, neuritis, skin dimpling, infection, pigmentation, scarring) and mortality were discussed.   I answered all questions pertaining to surgery and post operative expectations related to return to work and daily activity.   Time spent counseling and coordination of care: 25 minutes.  More than 50% of visit was spent reviewing and discussing venous duplex scan.  He will continue compression stockings and schedule as recommended.

## 2025-07-29 ENCOUNTER — TELEPHONE (OUTPATIENT)
Dept: VASCULAR SURGERY | Age: 65
End: 2025-07-29